# Patient Record
Sex: MALE | Race: ASIAN | Employment: OTHER | URBAN - METROPOLITAN AREA
[De-identification: names, ages, dates, MRNs, and addresses within clinical notes are randomized per-mention and may not be internally consistent; named-entity substitution may affect disease eponyms.]

---

## 2019-01-22 ENCOUNTER — OFFICE VISIT (OUTPATIENT)
Dept: FAMILY MEDICINE CLINIC | Age: 54
End: 2019-01-22

## 2019-01-22 VITALS
WEIGHT: 170.4 LBS | RESPIRATION RATE: 18 BRPM | BODY MASS INDEX: 25.24 KG/M2 | DIASTOLIC BLOOD PRESSURE: 79 MMHG | TEMPERATURE: 97.4 F | HEART RATE: 79 BPM | SYSTOLIC BLOOD PRESSURE: 105 MMHG | HEIGHT: 69 IN | OXYGEN SATURATION: 99 %

## 2019-01-22 DIAGNOSIS — I10 ESSENTIAL HYPERTENSION: Primary | ICD-10-CM

## 2019-01-22 DIAGNOSIS — E78.2 MIXED HYPERLIPIDEMIA: ICD-10-CM

## 2019-01-22 DIAGNOSIS — Z12.5 SCREENING FOR PROSTATE CANCER: ICD-10-CM

## 2019-01-22 DIAGNOSIS — Z23 ENCOUNTER FOR IMMUNIZATION: ICD-10-CM

## 2019-01-22 DIAGNOSIS — R51.9 NOCTURNAL HEADACHES: ICD-10-CM

## 2019-01-22 RX ORDER — EZETIMIBE 10 MG/1
10 TABLET ORAL DAILY
Qty: 90 TAB | Refills: 1 | Status: SHIPPED | OUTPATIENT
Start: 2019-01-22 | End: 2019-09-11 | Stop reason: SDUPTHER

## 2019-01-22 RX ORDER — LOSARTAN POTASSIUM 50 MG/1
50 TABLET ORAL DAILY
Qty: 90 TAB | Refills: 1 | Status: SHIPPED | OUTPATIENT
Start: 2019-01-22 | End: 2019-09-11 | Stop reason: SDUPTHER

## 2019-01-22 RX ORDER — LOSARTAN POTASSIUM 100 MG/1
TABLET ORAL
Refills: 3 | COMMUNITY
Start: 2018-12-11 | End: 2019-01-22 | Stop reason: DRUGHIGH

## 2019-01-22 RX ORDER — EZETIMIBE 10 MG/1
TABLET ORAL
COMMUNITY
End: 2019-01-22 | Stop reason: DRUGHIGH

## 2019-01-22 RX ORDER — KETOCONAZOLE 20 MG/ML
SHAMPOO TOPICAL
Refills: 3 | COMMUNITY
Start: 2018-12-04 | End: 2020-09-24 | Stop reason: ALTCHOICE

## 2019-01-22 NOTE — PROGRESS NOTES
[facility] Note    Eamon Jean is a 48 y.o. male who was seen in clinic today (2019). Subjective:  Cardiovascular Review:  The patient has hypertension and hyperlipidemia. Diet and Lifestyle: generally follows a low fat low cholesterol diet, generally follows a low sodium diet, exercises sporadically, nonsmoker  Home BP Monitorin/70s  Pertinent ROS: taking medications as instructed, no medication side effects noted, no TIA's, no chest pain on exertion, no dyspnea on exertion, no swelling of ankles. Headache   Patient complains of headaches for 2 month(s). Description of pain: dull pain, global. Duration of individual headaches: several hour(s), frequency several times per week. Associated symptoms: symptoms occur at night and wake him up from sleep, diminished vision. Pain relief: unable to obtain relief with OTC meds. Precipitating factors: patient is aware of none. Prior to Admission medications    Medication Sig Start Date End Date Taking? Authorizing Provider   losartan (COZAAR) 50 mg tablet Take 1 Tab by mouth daily. For blood pressure 19  Yes Joel Hoover, NP   ezetimibe (ZETIA) 10 mg tablet Take 1 Tab by mouth daily. For cholesterol 19  Yes Joel Hoover, NP   ketoconazole (NIZORAL) 2 % shampoo APPLY TO AFFECTED AREA, LATHER, LEAVE IN PLACE FOR 5 MINUTES AND THEN RINSE OFF WITH WATER ONCE A DAY. 18   Provider, Historical          No Known Allergies        ROS  See HPI    Objective:   Physical Exam   Constitutional: He is oriented to person, place, and time. He appears well-developed and well-nourished. Eyes: EOM are normal. Pupils are equal, round, and reactive to light. Neck: Normal range of motion. Neck supple. No JVD present. Carotid bruit is not present. No thyromegaly present. Cardiovascular: Normal rate, regular rhythm and intact distal pulses. Exam reveals no gallop and no friction rub.    No murmur heard.  Pulmonary/Chest: Effort normal and breath sounds normal. No respiratory distress. Musculoskeletal: He exhibits no edema. Lymphadenopathy:     He has no cervical adenopathy. Neurological: He is alert and oriented to person, place, and time. No sensory deficit. Coordination and gait normal.   Psychiatric: He has a normal mood and affect. His behavior is normal.   Nursing note and vitals reviewed. Visit Vitals  /79 (BP 1 Location: Right arm, BP Patient Position: Sitting)   Pulse 79   Temp 97.4 °F (36.3 °C) (Oral)   Resp 18   Ht 5' 9\" (1.753 m)   Wt 170 lb 6.4 oz (77.3 kg)   SpO2 99%   BMI 25.16 kg/m²       Assessment & Plan:  Diagnoses and all orders for this visit:    1. Essential hypertension  Well controlled. Will reduce to 50 mg patient request. Continue home monitoring and call for reading >140/90  -     losartan (COZAAR) 50 mg tablet; Take 1 Tab by mouth daily. For blood pressure  -     CBC W/O DIFF  -     METABOLIC PANEL, COMPREHENSIVE    2. Mixed hyperlipidemia  Reassess labs. Consider statin if needed. -     ezetimibe (ZETIA) 10 mg tablet; Take 1 Tab by mouth daily. For cholesterol  -     LIPID PANEL    3. Nocturnal headaches  Consider hypnic headache. MRI brain to rule out abnormality. Begin caffeine nightly. Referral to neurology for continued symptoms.   -     MRI BRAIN W WO CONT; Future  -     TSH AND FREE T4    4. Encounter for immunization  -     INFLUENZA VIRUS VAC QUAD,SPLIT,PRESV FREE SYRINGE IM    5. Screening for prostate cancer  -     PSA W/ REFLX FREE PSA      I have discussed the diagnosis with the patient and the intended plan as seen in the above orders. The patient has received an after-visit summary along with patient information handout. I have discussed medication side effects and warnings with the patient as well. Follow-up Disposition:  Return in about 6 months (around 7/22/2019) for blood pressure.         Danni Hinkle NP

## 2019-01-22 NOTE — PATIENT INSTRUCTIONS
???? ?????: ?????? ??????????? - [ Headache: Care Instructions ]  ????? ?????? ???????????    ???? ?????? ???? ???????? ???? ???? ???????? ???? ????? ????? ???? ??????? ????? ?? ??? ?? ???? ????? ??? ??? ???? ????? ??????? ?????? ???? ????? ???? ???????? ??????? ?????  ??????? ?????? ???? ?????? ??????? ??????, ??? ?????????? ??? ???? ??? ????? ??? ???? ???? ?????? ?? ???? ????? ?????? ??? ?????, ??? ???? ??????? ???? ??? ????  ????? ??????? ? ?????????? ???????? ??? ?? ????? ????? ?? ?????? ??? ?? ?????? ??? ??? ????? ?????????????? ??? ???? ??????????????? ?????? ????? ??????? ?? ??? ????? ????????????????? ???? ??? ???? ???? ???? ??? ??????? ???? ???? ????? ??????? ????? ????? ????? ??????? ???????? ????? ???? ??? ???? ?? ????? ???? ??????? ????, ??? ?????? ????? ???? ????? ??? ???? ????????  ?????? ???? ?????? ????? ???? ??????  · ????????? ????????????? ??? ???? ???? ???? ???? ??????? ???  · ?????? ?? ???? ????? ????? ???? ???????????, ??????? ??? ??????? ???? ??? ???? ???? ??? ??????? ?????? ?????? ???? ?? ?????? ???? ???? ?????? ?? ?? ???? ???? ????? ???  · ?????? ????? ????? ?????? ???? ??????, ???? ???? ?? ?????? ???? ????? 10-20 ??????? ???? ?????? ??? ?????? ??? ??? ????? ?????? ??????? ???? ????? ???? ??????  · ???? ??? ????? ???? ?? ????? ???????? ????? ???? ???? ??? ???? ?????? ?? ?? ??? ?? ????? ?? ????? ????? ??????? ?????  · ????? ????? ???? ?? ???? ????? ????? ????? ???? ?????  · ???????? ??????? ??????? ?????? ???? ????  ? ????? ??????? ??? ?????? ?????? ???? ???, ????? ??? ??????? ???? ?? ??????? ?????  ? ??? ???? ?????? ???? ?????????????? ???? ?? ??? ????? ????? ????????? ???????? ???? ?? ???? ?????? ????? ???? ???????????? ????? ???? ???? ?????? ?????  · ?????? ???? ?????? ?????? ??????? ????? ??? ??? ???? ???? ?????? ????? ????? ??, ???? ???? ??? ??? ???? ???? ???? ??? ?????? ???? ?? ??? ???? ????????? ??? ?????  · ???? ?????? ???? ???? ???? ?????? ???? ???? ?? ??????? ????? ??, ???? ????, ???????? ?? ??? ???, ????? ??????? ?? ?????????? ????? ??? ??? ???? ?????? ??????? ????? ??? ?????  ???? ????? ??????  · ???? ????? ????? ????????? ???? ????? ?? ?????? ????? ?????? ???? ?? ?? ????? ????? ???? ????? ?????? ?????????? ??????? ???? ????? ?? ???? ????? ????? ?????? ???? ????? ??? ???? ????? ???? ?????, ?? ?????? ????? ??? ?? ????? ????? ????? (???? ???, ???? ????? ????? ???, ????? ???? ????? ????? ?? ??????? ????) ?? ?????? ????? ???? ?????? ???? ????? ?? ???? ?????? ??? ???? ??? ?????, ???? ??? ??? ???, ???? ?????? ?? ??? ??? ?? ??????? ??? ?? ??? ?????? ???? ???? ? ????? ?????????? ??? ???? ????? ???? ??? ???? ??? ???? ????? ????? ???? ?????, ???? ????? (?????, ???, ?????) ?? ????, ??????, ??????? ???, ?????? ??? ?? ????? ????, ??? ?? ???? ???? ??????  · ?????? ????? ???? ???????? ???? ??????????? ???? ??????? ?????? ????? ??? ?? ??? ??? ????????? ???? ?????????? ???? ??? ?????? ??? ???? ???? ???? ??? ??? ???? ????? ?? ????? ??? ????? ???? ??? ?? ??? ??? ????????? ??????  · ?????? ????? ???????? ????? ???????? ????? ???? ?????? ????? ??????? ????? ????? ?????, ???, ??? ??? ?????? ????? ?? ???????? ?? ?? ????? ??? ??????? ???? ?????? ?????? ????? ?????? ??? ????? ???, ?? ?? ????? ???? ????? ???????, ??? ????? ?????? 30 ????????? ???? ??????? ?????  · ?????? ????? ? ??????? ?????  · ?????? ??? ???? ??????? ???? ???? ??? ?? ?????? ??? ???? ????? ??? ?????  · ???? ????? ???? ????? ????? ????? ???? ???? ????? ?????? ??? ???? ?????? ??????? ??? ???? ???????? ???? ????  · ???, ?? ?? ???? ??? ??? ????? ?????????? ?????? ????? ????? ?????? ???? ??? ???????? ???? ????? ?? ????? ????? ???? ?????? ???? ??? ?????  · ?????????? ??? ???? ??? ???????? ??? ??? ??? ??? ??? ???? ????? ????? ??????????? ??????? ???? ???? ???? ?????? ????? ??? ??? ????? ???? ????? ?????? ??? ??? ??? ????? ????????? ??? ???? ??? ??? ??? ??? ???? ????? ???? ????? ???? ????? ?? ??????? ?????  · ????? ??? ???????? ?? ?????????? ?? ????? ??????? ???? ????? ???? ?????? ???? ???? ?????? ????? ???? ??????? ???? ????? ????? ??? ????????? ?? ??????????? ?????? ???? ????? ????  ????? ??? ?????? ????? ?????  911 ?????? ??? ?????? ???? ???? ???? ???????? ??????? ???? ?????? ????????????, ??? ???? ???:  · ????? ????????? ????? ?????? ?? ????????? ??? ??? ????:  ? ???? ??? ??? ???, ???????, ?? ???, ??? ?? ??, ????? ??? ????? ?????? ???? ?? ???? ???????? ????? ?????  ? ???? ??? ????? ??????? ?????  ? ???? ??? ??? ???? ??????? ?????  ? ???? ??? ?????? ????????? ????? ??????? ?? ?????????? ?????  ? ???? ??? ?????? ?? ?????? ????????? ??????? ?????  ? ???? ??? ???? ???? ????? ????? ??????? ???? ????? ?????  ???? ????? ????????? ?????????? ???????? ??????? ?????? ???, ???:  · ????? ???? ??? ?? ??? ???? ???? ????? ??? ?????  · ????? ???? ????? ??? ?????  ?? ????? ???? ???? ???? ??? ???? ????? ???????  ????? [email or web address]  ?? ???????? M271 ??? ????? ???? ????????? ?????? ????? \"???? ?????: ?????? ??????????? - [ Headache: Care Instructions ]? \"  ??????? ?????? ???????: ??? 3, 2018  ??? ??????????? ???????: 11.9  © 8968-2698 [employer], [employer]. ?????????? ????? ????? ??????????? ?????? ??????????? ????? ????????? ????????? ??????? ????????? ?? ??? ???? ?? ????????? ????? ????? ???? ?????? ?????, ???? ?? ???? ???? ????? ????????? ?????????? ??? ???? ?? ????? ??????  Healthwise, Incorporated - ????? ?? ???? ??????? ???? ???? ??? ??? ?? ?? ????????? ??? ???

## 2019-01-22 NOTE — PROGRESS NOTES
Chief Complaint   Patient presents with    New Patient     to est. care    Hypertension     has been elevated recently and Losartan was increased from 50 mg to 100 mg daily    Headache     c/o h/a 's while sleeping x 2 months now, top of head       Reviewed Record in preparation for visit and have obtained necessary documentation. Identified pt with two pt identifiers (Name @ )    Health Maintenance Due   Topic    Hepatitis C Screening     DTaP/Tdap/Td series (1 - Tdap)    Shingrix Vaccine Age 50> (1 of 2)    FOBT Q 1 YEAR AGE 54-65     Influenza Age 5 to Adult          1. Have you been to the ER, urgent care clinic since your last visit? Hospitalized since your last visit? no    2. Have you seen or consulted any other health care providers outside of the [address] [place] [address] since your last visit? Include any pap smears or colon screening.  No

## 2019-01-24 LAB
ALBUMIN SERPL-MCNC: 4.5 G/DL (ref 3.5–5.5)
ALBUMIN/GLOB SERPL: 1.6 {RATIO} (ref 1.2–2.2)
ALP SERPL-CCNC: 78 IU/L (ref 39–117)
ALT SERPL-CCNC: 29 IU/L (ref 0–44)
AST SERPL-CCNC: 20 IU/L (ref 0–40)
BILIRUB SERPL-MCNC: 0.5 MG/DL (ref 0–1.2)
BUN SERPL-MCNC: 14 MG/DL (ref 6–24)
BUN/CREAT SERPL: 13 (ref 9–20)
CALCIUM SERPL-MCNC: 9.3 MG/DL (ref 8.7–10.2)
CHLORIDE SERPL-SCNC: 102 MMOL/L (ref 96–106)
CHOLEST SERPL-MCNC: 201 MG/DL (ref 100–199)
CO2 SERPL-SCNC: 23 MMOL/L (ref 20–29)
CREAT SERPL-MCNC: 1.08 MG/DL (ref 0.76–1.27)
ERYTHROCYTE [DISTWIDTH] IN BLOOD BY AUTOMATED COUNT: 14 % (ref 12.3–15.4)
GLOBULIN SER CALC-MCNC: 2.9 G/DL (ref 1.5–4.5)
GLUCOSE SERPL-MCNC: 118 MG/DL (ref 65–99)
HCT VFR BLD AUTO: 42.5 % (ref 37.5–51)
HDLC SERPL-MCNC: 37 MG/DL
HGB BLD-MCNC: 14.6 G/DL (ref 13–17.7)
INTERPRETATION, 910389: NORMAL
LDLC SERPL CALC-MCNC: 139 MG/DL (ref 0–99)
MCH RBC QN AUTO: 28.9 PG (ref 26.6–33)
MCHC RBC AUTO-ENTMCNC: 34.4 G/DL (ref 31.5–35.7)
MCV RBC AUTO: 84 FL (ref 79–97)
PLATELET # BLD AUTO: 230 X10E3/UL (ref 150–379)
POTASSIUM SERPL-SCNC: 4.4 MMOL/L (ref 3.5–5.2)
PROT SERPL-MCNC: 7.4 G/DL (ref 6–8.5)
PSA SERPL-MCNC: 0.5 NG/ML (ref 0–4)
RBC # BLD AUTO: 5.06 X10E6/UL (ref 4.14–5.8)
REFLEX CRITERIA: NORMAL
SODIUM SERPL-SCNC: 141 MMOL/L (ref 134–144)
T4 FREE SERPL-MCNC: 1.24 NG/DL (ref 0.82–1.77)
TRIGL SERPL-MCNC: 127 MG/DL (ref 0–149)
TSH SERPL DL<=0.005 MIU/L-ACNC: 1.12 UIU/ML (ref 0.45–4.5)
VLDLC SERPL CALC-MCNC: 25 MG/DL (ref 5–40)
WBC # BLD AUTO: 5.9 X10E3/UL (ref 3.4–10.8)

## 2019-02-13 ENCOUNTER — HOSPITAL ENCOUNTER (OUTPATIENT)
Dept: MRI IMAGING | Age: 54
Discharge: HOME | End: 2019-02-13

## 2019-02-13 DIAGNOSIS — R51.9 NOCTURNAL HEADACHES: ICD-10-CM

## 2019-03-28 ENCOUNTER — OFFICE VISIT (OUTPATIENT)
Dept: FAMILY MEDICINE CLINIC | Age: 54
End: 2019-03-28

## 2019-03-28 VITALS
HEIGHT: 69 IN | TEMPERATURE: 98.5 F | WEIGHT: 172 LBS | BODY MASS INDEX: 25.48 KG/M2 | SYSTOLIC BLOOD PRESSURE: 131 MMHG | RESPIRATION RATE: 19 BRPM | DIASTOLIC BLOOD PRESSURE: 83 MMHG | HEART RATE: 69 BPM | OXYGEN SATURATION: 99 %

## 2019-03-28 DIAGNOSIS — E78.2 MIXED HYPERLIPIDEMIA: ICD-10-CM

## 2019-03-28 DIAGNOSIS — M25.561 ACUTE PAIN OF RIGHT KNEE: ICD-10-CM

## 2019-03-28 DIAGNOSIS — K59.00 CONSTIPATION, UNSPECIFIED CONSTIPATION TYPE: ICD-10-CM

## 2019-03-28 DIAGNOSIS — R51.9 NOCTURNAL HEADACHES: ICD-10-CM

## 2019-03-28 DIAGNOSIS — K08.89 DENTALGIA: ICD-10-CM

## 2019-03-28 DIAGNOSIS — Z82.49 FAMILY HISTORY OF EARLY CAD: ICD-10-CM

## 2019-03-28 DIAGNOSIS — I10 ESSENTIAL HYPERTENSION: Primary | ICD-10-CM

## 2019-03-28 DIAGNOSIS — K64.8 INTERNAL HEMORRHOID: ICD-10-CM

## 2019-03-28 RX ORDER — ROSUVASTATIN CALCIUM 10 MG/1
10 TABLET, COATED ORAL
Qty: 90 TAB | Refills: 1 | Status: SHIPPED | OUTPATIENT
Start: 2019-03-28 | End: 2019-10-10 | Stop reason: ALTCHOICE

## 2019-03-28 RX ORDER — HYDROCORTISONE ACETATE 25 MG/1
25 SUPPOSITORY RECTAL EVERY 12 HOURS
Qty: 24 EACH | Refills: 2 | Status: SHIPPED | OUTPATIENT
Start: 2019-03-28 | End: 2019-10-10 | Stop reason: ALTCHOICE

## 2019-03-28 RX ORDER — POLYETHYLENE GLYCOL 3350 17 G/17G
17 POWDER, FOR SOLUTION ORAL
Qty: 30 EACH | Refills: 2 | Status: SHIPPED | OUTPATIENT
Start: 2019-03-28 | End: 2019-10-10 | Stop reason: ALTCHOICE

## 2019-03-28 NOTE — PROGRESS NOTES
[facility] Note    Genene Bence is a 48 y.o. male who was seen in clinic today (3/28/2019). Subjective:  Cardiovascular Review:  The patient has hypertension and hyperlipidemia. Patient reports family h/o early CAD (mother and sister)  Diet and Lifestyle: generally follows a low fat low cholesterol diet, generally follows a low sodium diet, exercises sporadically, nonsmoker  Home BP Monitorin/70s  Pertinent ROS: taking medications as instructed, no medication side effects noted, no TIA's, no chest pain on exertion, no dyspnea on exertion, no swelling of ankles. Headache   Patient complains of headaches for 4 month(s). Description of pain: dull pain, global. Duration of individual headaches: several hour(s), frequency several times per week. Associated symptoms: symptoms occur at night and wake him up from sleep, diminished vision. Pain relief: unable to obtain relief with OTC meds. Precipitating factors: patient is aware of none. MRI ordered but patient unable to perform due to claustrophobia. Patient reports intermittent lower tooth pain. Has not seen dentist.     Knee Pain  Patient complains of right knee pain. Onset of the symptoms was several weeks ago. Inciting event: no known injury. Current symptoms include pain location: right sided: anterior and severity of pain = moderate. Associated symptoms: none. Aggravating symptoms: running. Patient's overall course: symptoms have progressed to a point and plateaued. Patient has had no prior knee problems. Patient reports new onset of constipation. Reports internal hemorrhoids and fissure. Patient reports colonoscopy in 2016 with normal findings. Prior to Admission medications    Medication Sig Start Date End Date Taking? Authorizing Provider   polyethylene glycol (MIRALAX) 17 gram packet Take 1 Packet by mouth daily as needed (constipation).  3/28/19  Yes Carissa Hansen NP   hydrocortisone (ANUSOL-HC) 25 mg supp Insert 1 Suppository into rectum every twelve (12) hours. 3/28/19  Yes Nayeli Barbosa NP   rosuvastatin (CRESTOR) 10 mg tablet Take 1 Tab by mouth nightly. For cholesterol 3/28/19  Yes Yamilex Hoover NP   losartan (COZAAR) 50 mg tablet Take 1 Tab by mouth daily. For blood pressure 1/22/19  Yes Yamilex Hoover NP   ezetimibe (ZETIA) 10 mg tablet Take 1 Tab by mouth daily. For cholesterol 1/22/19  Yes Yamilex Hoover NP   ketoconazole (NIZORAL) 2 % shampoo APPLY TO AFFECTED AREA, LATHER, LEAVE IN PLACE FOR 5 MINUTES AND THEN RINSE OFF WITH WATER ONCE A DAY. 12/4/18   Provider, Historical          No Known Allergies        ROS  See HPI    Objective:   Physical Exam   Constitutional: He is oriented to person, place, and time. He appears well-developed and well-nourished. Neck: Normal range of motion. Neck supple. No JVD present. Carotid bruit is not present. No thyromegaly present. Cardiovascular: Normal rate, regular rhythm and intact distal pulses. Exam reveals no gallop and no friction rub. No murmur heard. Pulmonary/Chest: Effort normal and breath sounds normal. No respiratory distress. Musculoskeletal: He exhibits no edema. Right knee: He exhibits normal range of motion and no swelling. No tenderness found. Lymphadenopathy:     He has no cervical adenopathy. Neurological: He is alert and oriented to person, place, and time. Psychiatric: He has a normal mood and affect. His behavior is normal.   Nursing note and vitals reviewed. EKG: there are no previous tracings available for comparison, normal sinus rhythm. Visit Vitals  /83 (BP 1 Location: Left arm, BP Patient Position: Sitting)   Pulse 69   Temp 98.5 °F (36.9 °C) (Oral)   Resp 19   Ht 5' 9\" (1.753 m)   Wt 172 lb (78 kg)   SpO2 99%   BMI 25.40 kg/m²       Assessment & Plan:  Diagnoses and all orders for this visit:    1.  Essential hypertension  Stable, no changes to current therapy  - AMB POC EKG ROUTINE W/ 12 LEADS, INTER & REP    2. Mixed hyperlipidemia  ACC/AHA 10 year CVD risk calculation: 7.5%. Recommended adding staitn to imporve cholesterol profile and reduce CVD risk. -     rosuvastatin (CRESTOR) 10 mg tablet; Take 1 Tab by mouth nightly. For cholesterol    3. Family history of early CAD  As above. 4. Acute pain of right knee  Consider patellofemoral syndrome. X-ray today. Request physical therapy evaluation and treatment. -     XR KNEE RT 3 V; Future  -     REFERRAL TO PHYSICAL THERAPY    5. Nocturnal headaches  Resolving. Referral to neurology for continued symptoms. 6. Dentalgia  Contact information to [address] [place] [address] dental [employer] provided. 7. Constipation, unspecified constipation type  Begin Miralax. Referral to GI for continued symptoms. -     polyethylene glycol (MIRALAX) 17 gram packet; Take 1 Packet by mouth daily as needed (constipation). 8. Internal hemorrhoid  -     Refill hydrocortisone (ANUSOL-HC) 25 mg supp; Insert 1 Suppository into rectum every twelve (12) hours. I have discussed the diagnosis with the patient and the intended plan as seen in the above orders. The patient has received an after-visit summary along with patient information handout. I have discussed medication side effects and warnings with the patient as well. Follow-up and Dispositions    · Return in about 6 months (around 9/28/2019) for blood pressure.        Ne Brooks, NP       Counseling More Than 50% of the Appointment Time: Face-to face time >40 minutes

## 2019-03-28 NOTE — PROGRESS NOTES
Chief Complaint   Patient presents with    Hypertension    Knee Pain     right knee pain x 2 months       Reviewed Record in preparation for visit and have obtained necessary documentation. Identified pt with two pt identifiers (Name @ )    Health Maintenance Due   Topic    Hepatitis C Screening     FOBT Q 1 YEAR AGE 50-75          1. Have you been to the ER, urgent care clinic since your last visit? Hospitalized since your last visit? no    2. Have you seen or consulted any other health care providers outside of the [address] [place] [address] since your last visit? Include any pap smears or colon screening.  no

## 2019-03-28 NOTE — PATIENT INSTRUCTIONS
Knee Pain or Injury: Care Instructions  Your Care Instructions    Injuries are a common cause of knee problems. Sudden (acute) injuries may be caused by a direct blow to the knee. They can also be caused by abnormal twisting, bending, or falling on the knee. Pain, bruising, or swelling may be severe, and may start within minutes of the injury. Overuse is another cause of knee pain. Other causes are climbing stairs, kneeling, and other activities that use the knee. Everyday wear and tear, especially as you get older, also can cause knee pain. Rest, along with home treatment, often relieves pain and allows your knee to heal. If you have a serious knee injury, you may need tests and treatment. Follow-up care is a key part of your treatment and safety. Be sure to make and go to all appointments, and call your doctor if you are having problems. It's also a good idea to know your test results and keep a list of the medicines you take. How can you care for yourself at home? · Be safe with medicines. Read and follow all instructions on the label. ? If the doctor gave you a prescription medicine for pain, take it as prescribed. ? If you are not taking a prescription pain medicine, ask your doctor if you can take an over-the-counter medicine. · Rest and protect your knee. Take a break from any activity that may cause pain. · Put ice or a cold pack on your knee for 10 to 20 minutes at a time. Put a thin cloth between the ice and your skin. · Prop up a sore knee on a pillow when you ice it or anytime you sit or lie down for the next 3 days. Try to keep it above the level of your heart. This will help reduce swelling. · If your knee is not swollen, you can put moist heat, a heating pad, or a warm cloth on your knee. · If your doctor recommends an elastic bandage, sleeve, or other type of support for your knee, wear it as directed.   · Follow your doctor's instructions about how much weight you can put on your leg. Use a cane, crutches, or a walker as instructed. · Follow your doctor's instructions about activity during your healing process. If you can do mild exercise, slowly increase your activity. · Reach and stay at a healthy weight. Extra weight can strain the joints, especially the knees and hips, and make the pain worse. Losing even a few pounds may help. When should you call for help? Call 911 anytime you think you may need emergency care. For example, call if:    · You have symptoms of a blood clot in your lung (called a pulmonary embolism). These may include:  ? Sudden chest pain. ? Trouble breathing. ? Coughing up blood.    Call your doctor now or seek immediate medical care if:    · You have severe or increasing pain.     · Your leg or foot turns cold or changes color.     · You cannot stand or put weight on your knee.     · Your knee looks twisted or bent out of shape.     · You cannot move your knee.     · You have signs of infection, such as:  ? Increased pain, swelling, warmth, or redness. ? Red streaks leading from the knee. ? Pus draining from a place on your knee. ? A fever.     · You have signs of a blood clot in your leg (called a deep vein thrombosis), such as:  ? Pain in your calf, back of the knee, thigh, or groin. ? Redness and swelling in your leg or groin.    Watch closely for changes in your health, and be sure to contact your doctor if:    · You have tingling, weakness, or numbness in your knee.     · You have any new symptoms, such as swelling.     · You have bruises from a knee injury that last longer than 2 weeks.     · You do not get better as expected. Where can you learn more? Go to http://ai-kaleigh.info/. Enter K195 in the search box to learn more about \"Knee Pain or Injury: Care Instructions. \"  Current as of: September 23, 2018  Content Version: 11.9  © 3642-0478 [employer].  Care instructions adapted under license by [employer] (which disclaims liability or warranty for this information). If you have questions about a medical condition or this instruction, always ask your healthcare professional. [address] any warranty or liability for your use of this information.

## 2019-03-28 NOTE — LETTER
3/28/2019 3:09 PM 
 
Mr. Argenis Pederson [employer] [address] [place] [id number] Dear Argenis Pederson: 
 
Please find your most recent results below. Resulted Orders XR KNEE RT 3 V (Exam End: 3/28/2019  1:09 PM) Narrative EXAM: XR KNEE RT 3 V 
 
INDICATION: right knee pain. COMPARISON: None. FINDINGS: Three views of the right knee demonstrate no fracture or other acute 
osseous or articular abnormality. There is no effusion. Impression IMPRESSION: No acute abnormality. Right knee X-ray was normal. Follow up with PT as recommended. Referral to orthopedics for continued symptoms. Please call me if you have any questions: [phone] Sincerely, [place] Westfall NP

## 2019-04-08 ENCOUNTER — HOSPITAL ENCOUNTER (OUTPATIENT)
Dept: PHYSICAL THERAPY | Age: 54
Discharge: HOME | End: 2019-04-08
Payer: COMMERCIAL

## 2019-04-08 PROCEDURE — 97110 THERAPEUTIC EXERCISES: CPT | Performed by: PHYSICAL THERAPY ASSISTANT

## 2019-04-08 PROCEDURE — 97014 ELECTRIC STIMULATION THERAPY: CPT | Performed by: PHYSICAL THERAPY ASSISTANT

## 2019-04-08 PROCEDURE — 97162 PT EVAL MOD COMPLEX 30 MIN: CPT | Performed by: PHYSICAL THERAPY ASSISTANT

## 2019-04-08 NOTE — PROGRESS NOTES
[employer] [facility]  [address]  [place], [address] [facility]  Phone: [phone]  Fax: [phone]    Plan of Care/Statement of Necessity for Physical Therapy Services  2-15    Patient name: Milka Umanzor  : 1965  Provider#: [id number]  Referral source: Berkley Sharpe NP      Medical/Treatment Diagnosis: Right knee pain [M25.561]     Prior Hospitalization: see medical history     Comorbidities: see chart  Prior Level of Function: running  Medications: Verified on Patient Summary List  Start of Care: 19      Onset Date: few weeks ago  The Plan of Care and following information is based on the information from the initial evaluation. Assessment/ key information: Pt has signs and symptoms of mild medial meniscus derangement that affects his ability to run. Pt was given KT tape and educated on gym modifications/ice application. Pt will return next week to check progress.     Evaluation Complexity History LOW Complexity : Zero comorbidities / personal factors that will impact the outcome / POC; Examination LOW Complexity : 1-2 Standardized tests and measures addressing body structure, function, activity limitation and / or participation in recreation  ;Presentation LOW Complexity : Stable, uncomplicated  ;Clinical Decision Making MEDIUM Complexity : FOTO score of 26-74  Overall Complexity Rating: LOW     Problem List: pain affecting function, decrease ROM, decrease strength, edema affecting function, impaired gait/ balance, decrease ADL/ functional abilitiies, decrease activity tolerance and decrease flexibility/ joint mobility   Treatment Plan may include any combination of the following: Therapeutic exercise, Therapeutic activities, Neuromuscular re-education, Physical agent/modality, Gait/balance training, Manual therapy and Patient education  Patient / Family readiness to learn indicated by: asking questions  Persons(s) to be included in education: patient (P)  Barriers to Learning/Limitations: None  Patient Goal (s): decrease my pain after I run  Patient Self Reported Health Status: good  Rehabilitation Potential: good    Short Term Goals: To be accomplished in 2 weeks:  Pt will be independent w/ HEP  Pt will report 50% decrease in pain when walking  Pt will demonstrate ability to navigate stairs w/o pain  Long Term Goals: To be accomplished in 6 weeks:  Pt will be able to run for 20 minutes w/o increase in pain  Pt will demonstrate full squats w/o pain  Frequency / Duration: Patient to be seen 1 times per week for 6 weeks. Patient/ Caregiver education and instruction: self care    [x]  Plan of care has been reviewed with PTA        Certification Period: 4/8/19-7/8/19  Santa Lanier, PT, DPT 4/8/2019    ________________________________________________________________________    I certify that the above Therapy Services are being furnished while the patient is under my care. I agree with the treatment plan and certify that this therapy is necessary.     [de-identified] Signature:____________________  Date:____________Time: _________

## 2019-04-08 NOTE — PROGRESS NOTES
PT INITIAL EVALUATION NOTE - [facility] 2-15    Patient Name: Anjum Mcguire  Date:2019  : 1965  [x]  Patient  Verified  Payor: [address] / Plan: [address] / Product Type: Managed Care Medicaid /    In time:11:15 am  Out time:12:00 pm  Total Treatment Time (min): 45  Total Timed Codes (min): 15  1:1 Treatment Time ( only): 15   Visit #: 1     Treatment Area: Right knee pain [M25.561]    SUBJECTIVE  Pain Level (0-10 scale): 0  Any medication changes, allergies to medications, adverse drug reactions, diagnosis change, or new procedure performed?: [] No    [x] Yes (see summary sheet for update)  Subjective:    Pt complains of R knee pain that started a few weeks ago. Pt only has pain after running on treadmill or squatting and twisting knee. Pt received x-ray that is negative. 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     PLOF: running at gym  Mechanism of Injury: running  Previous Treatment/Compliance: none  PMHx/Surgical Hx: see chart  Work Hx:   Living Situation: w/ 3 daughters and wife, 1 story  Pt Goals: \"decrease my pain after I run\"  Barriers: none  Motivation: good  Substance use: none  FABQ Score: see FOTO  Cognition: A & O x 4        OBJECTIVE/EXAMINATION  Posture:  normal  Gait and Functional Mobility:  normal  Palpation: TTP over medial joint line    R Knee ROM: full w/o pain    LOWER QUARTER   MUSCLE STRENGTH  KEY       R  L  0 - No Contraction  L1, L2 Psoas  5  5  1 - Trace   L3 Quads  5  5  2 - Poor   L4 Tib Ant  5  5  3 - Fair    L5 EHL  5  5  4 - Good   S1 FHL  5  5  5 - Normal   S2 Hams  5  5          MMT: 5/5  Neurological: Reflexes / Sensations: normal  Special Tests: + roly's, - thessaly's    Modality rationale: decrease edema, decrease inflammation and decrease pain to improve the patients ability to ambulate and run   Min Type Additional Details    [x] Estim: []Att   [x]Unatt        []TENS instruct                  []IFC []Premod   [x]NMES                     []Other:  []w/US   [x]w/ice   []w/heat  Position: supine  Location: medial joint line- 10'    []  Traction: [] Cervical       []Lumbar                       [] Prone          []Supine                       []Intermittent   []Continuous Lbs:  [] before manual  [] after manual  []w/heat    []  Ultrasound: []Continuous   [] Pulsed at:                           []1MHz   []3MHz Location:  W/cm2:    [] Paraffin         Location:   []w/heat    []  Ice     []  Heat  []  Ice massage Position:  Location:    []  Laser  []  Other: Position:  Location:      []  Vasopneumatic Device Pressure:       [] lo [] med [] hi   Temperature:      [x] Skin assessment post-treatment:  [x]intact []redness- no adverse reaction    []redness - adverse reaction:     15 min Therapeutic Exercise:  [x] See flow sheet :   Rationale: increase ROM, increase strength and improve coordination to improve the patients ability to ambulate and run      With   [] TE   [] TA   [] neuro   [] other: Patient Education: [x] Review HEP    [] Progressed/Changed HEP based on:   [] positioning   [] body mechanics   [] transfers   [] heat/ice application    [] other:      Other Objective/Functional Measures:     Pain Level (0-10 scale) post treatment: 0    ASSESSMENT/Changes in Function:     [x]  See Plan of Care      Prisca Lanier, PT, DPT 4/8/2019

## 2019-04-17 ENCOUNTER — TELEPHONE (OUTPATIENT)
Dept: FAMILY MEDICINE CLINIC | Age: 54
End: 2019-04-17

## 2019-04-17 NOTE — TELEPHONE ENCOUNTER
Pt came inside and stated that the hydrocortisone (ANUSOL-HC) 25 is not covered with his ins  Pt also stated that he went to physical therapy and he was told he is not a possible candidate for therepy     verified pt info and pharmacy  info

## 2019-04-18 RX ORDER — HYDROCORTISONE ACETATE, PRAMOXINE HCL 2.5; 1 G/100G; G/100G
CREAM TOPICAL 3 TIMES DAILY
Qty: 30 G | Refills: 0 | Status: SHIPPED | OUTPATIENT
Start: 2019-04-18 | End: 2020-02-17 | Stop reason: ALTCHOICE

## 2019-04-18 NOTE — TELEPHONE ENCOUNTER
Alternative hemorrhoid medication sent to pharmacy. I reviewed PT note. It indicates patient is a good candidate for therapy and recommend continuing therapy.

## 2019-04-20 ENCOUNTER — OFFICE VISIT (OUTPATIENT)
Dept: FAMILY MEDICINE CLINIC | Age: 54
End: 2019-04-20

## 2019-04-20 VITALS
SYSTOLIC BLOOD PRESSURE: 110 MMHG | TEMPERATURE: 97 F | OXYGEN SATURATION: 100 % | RESPIRATION RATE: 16 BRPM | WEIGHT: 170 LBS | HEART RATE: 73 BPM | HEIGHT: 69 IN | DIASTOLIC BLOOD PRESSURE: 76 MMHG | BODY MASS INDEX: 25.18 KG/M2

## 2019-04-20 DIAGNOSIS — M25.561 RIGHT KNEE PAIN, UNSPECIFIED CHRONICITY: Primary | ICD-10-CM

## 2019-04-20 DIAGNOSIS — L40.9 PSORIASIS: ICD-10-CM

## 2019-04-20 RX ORDER — CLOBETASOL PROPIONATE 0.5 MG/G
OINTMENT TOPICAL 2 TIMES DAILY
Qty: 30 G | Refills: 0 | Status: SHIPPED | OUTPATIENT
Start: 2019-04-20 | End: 2020-02-17 | Stop reason: ALTCHOICE

## 2019-04-20 NOTE — PROGRESS NOTES
Chief Complaint   Patient presents with    Knee Pain     has had ongoing rt. knee pain for several weeks. Saw Roxy Burton  and xrayed ( neg ) and PT . kt  tape applied , no better       Reviewed Record in preparation for visit and have obtained necessary documentation. Identified pt with two pt identifiers (Name @ )    Health Maintenance Due   Topic    Hepatitis C Screening     DTaP/Tdap/Td series (1 - Tdap)    FOBT Q 1 YEAR AGE 50-75          1. Have you been to the ER, urgent care clinic since your last visit? Hospitalized since your last visit? no    2. Have you seen or consulted any other health care providers outside of the [address] [place] [address] since your last visit? Include any pap smears or colon screening.  no

## 2019-04-20 NOTE — PROGRESS NOTES
HISTORY OF PRESENT ILLNESS  Jaswinder Baeza is a 48 y.o. male. 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 He currently is in PT for knee pain. Denies locking/giving out. No swelling. Used ice pack with sx relief. C/o itchy rash on right knee, has had for several months. Past medical history, social history, family history and medications were reviewed and updated. Blood pressure 110/76, pulse 73, temperature 97 °F (36.1 °C), temperature source Oral, resp. rate 16, height 5' 9\" (1.753 m), weight 170 lb (77.1 kg), SpO2 100 %. Review of Systems   Constitutional: Negative. Musculoskeletal: Positive for joint pain. Skin: Positive for itching and rash. All other systems reviewed and are negative. Physical Exam   Constitutional: No distress. Musculoskeletal:        Right knee: He exhibits normal range of motion, no swelling, no LCL laxity, no bony tenderness and no MCL laxity. Tenderness found. Medial joint line tenderness noted. Skin: Rash (Approximately quarter sized silvery plaque on right knee.  ) noted. ASSESSMENT and PLAN  Diagnoses and all orders for this visit:    1. Right knee pain, unspecified chronicity  Currently in PT for symptoms. Mild exacerbation after prolonged sitting, now resolved. Recommend periodic stretching when driving long distances. Trial salon pas or biofreeze ointment prn. Continue to ice as needed. Avoid applying ointment to rash. 2. Psoriasis  -     clobetasol (TEMOVATE) 0.05 % ointment; Apply  to affected area two (2) times a day. Use as directed. Follow up prn.

## 2019-05-30 NOTE — PROGRESS NOTES
[facility]  [address]  [place], [address] [facility]  Phone: [phone]  Fax: [phone]    Discharge Summary  2-15    Patient name: Catalina Christian  : 1965  Provider#:[id number]  Referral source: Magalie Blakely NP      Medical/Treatment Diagnosis: Right knee pain [M25.561]     Prior Hospitalization: see medical history     Comorbidities: see chart  Prior Level of Function:runner  Medications: Verified on Patient Summary List    Start of Care: 19      Onset Date:few weeks ago   Visits from Start of Care: 1     Missed Visits: 0  Reporting Period : 19 to 19      ASSESSMENT/SUMMARY OF CARE: Pt attended initial eval and was educated on buying knee brace. Pt was given HEP and return to running protocol.      RECOMMENDATIONS:  [x]Discontinue therapy: [x]Patient has reached or is progressing toward set goals      []Patient is non-compliant or has abdicated      []Due to lack of appreciable progress towards set goals    Marycarmen Campos PT, DPT 2019

## 2019-09-11 DIAGNOSIS — I10 ESSENTIAL HYPERTENSION: ICD-10-CM

## 2019-09-11 DIAGNOSIS — E78.2 MIXED HYPERLIPIDEMIA: ICD-10-CM

## 2019-09-11 RX ORDER — LOSARTAN POTASSIUM 50 MG/1
50 TABLET ORAL DAILY
Qty: 30 TAB | Refills: 5 | Status: SHIPPED | OUTPATIENT
Start: 2019-09-11 | End: 2019-09-24 | Stop reason: RX

## 2019-09-11 RX ORDER — EZETIMIBE 10 MG/1
10 TABLET ORAL DAILY
Qty: 30 TAB | Refills: 5 | Status: SHIPPED | OUTPATIENT
Start: 2019-09-11 | End: 2020-03-19

## 2019-09-20 ENCOUNTER — TELEPHONE (OUTPATIENT)
Dept: FAMILY MEDICINE CLINIC | Age: 54
End: 2019-09-20

## 2019-09-20 DIAGNOSIS — I10 ESSENTIAL HYPERTENSION: Primary | ICD-10-CM

## 2019-09-20 NOTE — TELEPHONE ENCOUNTER
Called [facility] to find out what's going on per pharmacist Lorsartan is in manufacture back order so they need alternative.  Advised will send message to NP [facility]

## 2019-09-20 NOTE — TELEPHONE ENCOUNTER
Patient is calling stating that he went to  the medication  losartan (COZAAR) 50 mg tablet [[id number]]     Order Details   But pharmacy states they are not making this medication anymore. Requesting an alternative Script be sent to the pharmacy  LOV:April 20, 2019 09:00 AM  BCB# : [phone]  Pharmacy verified     Patient is completely out and need the medication today.

## 2019-09-24 RX ORDER — VALSARTAN 40 MG/1
40 TABLET ORAL DAILY
Qty: 90 TAB | Refills: 1 | Status: SHIPPED | OUTPATIENT
Start: 2019-09-24 | End: 2019-10-10 | Stop reason: ALTCHOICE

## 2019-10-09 ENCOUNTER — TELEPHONE (OUTPATIENT)
Dept: FAMILY MEDICINE CLINIC | Age: 54
End: 2019-10-09

## 2019-10-09 NOTE — TELEPHONE ENCOUNTER
Call placed to patient. Name and  verified. Patient requested to establish care with Dr. Christopher Jerez. Advised patient of provider next new patient appt 3/25/20. He got angry and stated he is not a new patient he sees NP Sneha. Advised that he is established with Eliecer Seo but if he wants to change providers he would be a new patient and would be scheduled in a new patient slot. Patient stated so if I have something wrong and I die you wont let me see a doctor. Advised that he is established and if he feels ill he can seek urgent care. He stated he sees NP but he knows nothing, and he wants to see a doctor. Patient because belligerent with writer and raised voice. Advised I understand his concerns but again as a new patient he would be scheduled beck new patient slot.  Patient scheduled for 2020

## 2019-10-09 NOTE — TELEPHONE ENCOUNTER
Pt states his wife Britany Cornelius is Dr Tello Mcduffie pt and he wants to be her patient, he states he spoke to Dr Gibran Greenwood about this and she was okay with it. Requesting a new patient apt soon.   BCB# [phone]     Please advice

## 2019-10-10 ENCOUNTER — OFFICE VISIT (OUTPATIENT)
Dept: FAMILY MEDICINE CLINIC | Age: 54
End: 2019-10-10

## 2019-10-10 VITALS
HEIGHT: 69 IN | TEMPERATURE: 97.9 F | DIASTOLIC BLOOD PRESSURE: 79 MMHG | BODY MASS INDEX: 24.59 KG/M2 | HEART RATE: 74 BPM | WEIGHT: 166 LBS | SYSTOLIC BLOOD PRESSURE: 120 MMHG | RESPIRATION RATE: 16 BRPM | OXYGEN SATURATION: 98 %

## 2019-10-10 DIAGNOSIS — Z23 ENCOUNTER FOR IMMUNIZATION: ICD-10-CM

## 2019-10-10 DIAGNOSIS — M22.2X1 PATELLOFEMORAL SYNDROME OF RIGHT KNEE: Primary | ICD-10-CM

## 2019-10-10 RX ORDER — LOSARTAN POTASSIUM 50 MG/1
TABLET ORAL
Refills: 1 | COMMUNITY
Start: 2019-09-25 | End: 2020-01-22 | Stop reason: ALTCHOICE

## 2019-10-10 RX ORDER — DICLOFENAC SODIUM 75 MG/1
75 TABLET, DELAYED RELEASE ORAL 2 TIMES DAILY
Qty: 60 TAB | Refills: 0 | Status: SHIPPED | OUTPATIENT
Start: 2019-10-10 | End: 2020-01-22 | Stop reason: ALTCHOICE

## 2019-10-10 NOTE — PROGRESS NOTES
Chief Complaint   Patient presents with    Knee Pain     Patient is in the office today with complaints of Bilateral Knee Pain. 1. Have you been to the ER, urgent care clinic since your last visit? Hospitalized since your last visit? No    2. Have you seen or consulted any other health care providers outside of the [address] [place] [address] since your last visit? Include any pap smears or colon screening.  No

## 2019-10-10 NOTE — PATIENT INSTRUCTIONS
Vaccine Information Statement    Influenza (Flu) Vaccine (Inactivated or Recombinant): What You Need to Know    Many Vaccine Information Statements are available in Kiswahili and other languages. See www.immunize.org/vis  Hojas de información sobre vacunas están disponibles en español y en muchos otros idiomas. Visite www.immunize.org/vis    1. Why get vaccinated? Influenza vaccine can prevent influenza (flu). Flu is a contagious disease that spreads around the [facility] every year, usually between October and May. Anyone can get the flu, but it is more dangerous for some people. Infants and young children, people 72years of age and older, pregnant women, and people with certain health conditions or a weakened immune system are at greatest risk of flu complications. Pneumonia, bronchitis, sinus infections and ear infections are examples of flu-related complications. If you have a medical condition, such as heart disease, cancer or diabetes, flu can make it worse. Flu can cause fever and chills, sore throat, muscle aches, fatigue, cough, headache, and runny or stuffy nose. Some people may have vomiting and diarrhea, though this is more common in children than adults. Each year thousands of people in the [facility] die from flu, and many more are hospitalized. Flu vaccine prevents millions of illnesses and flu-related visits to the doctor each year. 2. Influenza vaccines     CDC recommends everyone 10months of age and older get vaccinated every flu season. Children 6 months through 6years of age may need 2 doses during a single flu season. Everyone else needs only 1 dose each flu season. It takes about 2 weeks for protection to develop after vaccination. There are many flu viruses, and they are always changing. Each year a new flu vaccine is made to protect against three or four viruses that are likely to cause disease in the upcoming flu season.  Even when the vaccine doesnt exactly Verified Results  XR CHEST 2V [id number] 11:54AM PEYMAN WELDON     Test Name Result Flag Reference   XR CHEST PA, LATERAL 2V (Report)     Accession #    [id number]    XR CHEST 2V - 7/7/2018 11:54 AM    INDICATION:  COUGH    COMPARISON:  None.    FINDINGS:    The cardiomediastinal silhouette is unremarkable. The lungs are clear. The pulmonary vasculature   is unremarkable. The visualized bones are grossly intact.    IMPRESSION:    No evidence of acute cardiopulmonary process.    **** F I N A L ****    Transcribed By: REGINALDO   07/09/18 12:20 pm    Dictated By:      ZAKIYA ARREDONDO DO    Electronically Reviewed and Approved By:      ZAKIYA ARREDONDO DO 07/09/18 12:20 pm       Message   chest xray is normal      match these viruses, it may still provide some protection. Influenza vaccine does not cause flu. Influenza vaccine may be given at the same time as other vaccines. 3. Talk with your health care provider    Tell your vaccine provider if the person getting the vaccine:   Has had an allergic reaction after a previous dose of influenza vaccine, or has any severe, life-threatening allergies.  Has ever had Guillain-Barré Syndrome (also called GBS). In some cases, your health care provider may decide to postpone influenza vaccination to a future visit. People with minor illnesses, such as a cold, may be vaccinated. People who are moderately or severely ill should usually wait until they recover before getting influenza vaccine. Your health care provider can give you more information. 4. Risks of a reaction     Soreness, redness, and swelling where shot is given, fever, muscle aches, and headache can happen after influenza vaccine.  There may be a very small increased risk of Guillain-Barré Syndrome (GBS) after inactivated influenza vaccine (the flu shot). Baylee Briseno children who get the flu shot along with pneumococcal vaccine (PCV13), and/or DTaP vaccine at the same time might be slightly more likely to have a seizure caused by fever. Tell your health care provider if a child who is getting flu vaccine has ever had a seizure. People sometimes faint after medical procedures, including vaccination. Tell your provider if you feel dizzy or have vision changes or ringing in the ears. As with any medicine, there is a very remote chance of a vaccine causing a severe allergic reaction, other serious injury, or death. 5. What if there is a serious problem? An allergic reaction could occur after the vaccinated person leaves the clinic.  If you see signs of a severe allergic reaction (hives, swelling of the face and throat, difficulty breathing, a fast heartbeat, dizziness, or weakness), call 9-1-1 and get the person to the nearest hospital.    For other signs that concern you, call your health care provider. Adverse reactions should be reported to the Vaccine Adverse Event Reporting System (VAERS). Your health care provider will usually file this report, or you can do it yourself. Visit the VAERS website at www.vaers. [facility].gov or call 7-487.400.7935. VAERS is only for reporting reactions, and VAERS staff do not give medical advice. 6. The National Vaccine Injury Compensation Program    The [facility] Vaccine Injury Compensation Program (VICP) is a federal program that was created to compensate people who may have been injured by certain vaccines. Visit the VICP website at www.[facility].gov/vaccinecompensation or call 3-631.737.3995 to learn about the program and about filing a claim. There is a time limit to file a claim for compensation. 7. How can I learn more?  Ask your health care provider.  Call your local or state health department.  Contact the Centers for Disease Control and Prevention (CDC):  - Call 2-535.152.3546 (1-800-CDC-INFO) or  - Visit CDCs influenza website at www.cdc.gov/flu    Vaccine Information Statement (Interim)  Inactivated Influenza Vaccine   8/15/2019  42 RONYYue Carrera Toño [id number]   Department of Health and Human Services  Centers for Disease Control and Prevention    Office Use Only         Patellofemoral Pain Syndrome (Runner's Knee): Exercises  Introduction  Here are some examples of exercises for you to try. The exercises may be suggested for a condition or for rehabilitation. Start each exercise slowly. Ease off the exercises if you start to have pain. You will be told when to start these exercises and which ones will work best for you. How to do the exercises  Calf wall stretch    1. Stand facing a wall with your hands on the wall at about eye level. Put your affected leg about a step behind your other leg.   2. Keeping your back leg straight and your back heel on the floor, bend your front knee and gently bring your hip and chest toward the wall until you feel a stretch in the calf of your back leg. 3. Hold the stretch for at least 15 to 30 seconds. 4. Repeat 2 to 4 times. 5. Repeat steps 1 through 4, but this time keep your back knee bent. Quadriceps stretch    1. If you are not steady on your feet, hold on to a chair, counter, or wall. 2. Bend your affected leg, and reach behind you to grab the front of your foot or ankle with the hand on the same side. For example, if you are stretching your right leg, use your right hand. 3. Keeping your knees next to each other, pull your foot toward your buttock until you feel a gentle stretch across the front of your hip and down the front of your thigh. Your knee should be pointed directly to the ground, and not out to the side. 4. Hold the stretch for at least 15 to 30 seconds. 5. Repeat 2 to 4 times. Hamstring wall stretch    1. Lie on your back in a doorway, with your good leg through the open door. 2. Slide your affected leg up the wall to straighten your knee. You should feel a gentle stretch down the back of your leg. 1. Do not arch your back. 2. Do not bend either knee. 3. Keep one heel touching the floor and the other heel touching the wall. Do not point your toes. 3. Hold the stretch for at least 1 minute. Then over time, try to lengthen the time you hold the stretch to as long as 6 minutes. 4. Repeat 2 to 4 times. 5. If you do not have a place to do this exercise in a doorway, there is another way to do it:  6. Lie on your back, and bend your affected leg. 7. Loop a towel under the ball and toes of that foot, and hold the ends of the towel in your hands. 8. Straighten your knee, and slowly pull back on the towel. You should feel a gentle stretch down the back of your leg. 9. Hold the stretch for at least 15 to 30 seconds. Or even better, hold the stretch for 1 minute if you can.   10. Repeat 2 to 4 times.    Quad sets    1. Sit with your affected leg straight and supported on the floor or a firm bed. Place a small, rolled-up towel under your affected knee. Your other leg should be bent, with that foot flat on the floor. 2. Tighten the thigh muscles of your affected leg by pressing the back of your knee down into the towel. 3. Hold for about 6 seconds, then rest for up to 10 seconds. 4. Repeat 8 to 12 times. Straight-leg raises to the front    1. Lie on your back with your good knee bent so that your foot rests flat on the floor. Your affected leg should be straight. Make sure that your low back has a normal curve. You should be able to slip your hand in between the floor and the small of your back, with your palm touching the floor and your back touching the back of your hand. 2. Tighten the thigh muscles in your affected leg by pressing the back of your knee flat down to the floor. Hold your knee straight. 3. Keeping the thigh muscles tight and your leg straight, lift your affected leg up so that your heel is about 12 inches off the floor. 4. Hold for about 6 seconds, then lower your leg slowly. Rest for up to 10 seconds between repetitions. 5. Repeat 8 to 12 times. Straight-leg raises to the back    1. Lie on your stomach, and lift your leg straight up behind you (toward the ceiling). 2. Lift your toes about 6 inches off the floor, hold for about 6 seconds, then lower slowly. 3. Do 8 to 12 repetitions. Wall slide with ball squeeze    1. Stand with your back against a wall and with your feet about shoulder-width apart. Your feet should be about 12 inches away from the wall. 2. Put a ball about the size of a soccer ball between your knees. Then slowly slide down the wall until your knees are bent about 20 to 30 degrees. 3. Tighten your thigh muscles by squeezing the ball between your knees. Hold that position for about 10 seconds, then stop squeezing.  Rest for up to 10 seconds between repetitions. 4. Repeat 8 to 12 times. Follow-up care is a key part of your treatment and safety. Be sure to make and go to all appointments, and call your doctor if you are having problems. It's also a good idea to know your test results and keep a list of the medicines you take. Where can you learn more? Go to http://ai-kaleigh.info/. Enter A404 in the search box to learn more about \"Patellofemoral Pain Syndrome (Runner's Knee): Exercises. \"  Current as of: June 26, 2019  Content Version: 12.2  © 1411-7608 [employer]. Care instructions adapted under license by [employer] (which disclaims liability or warranty for this information). If you have questions about a medical condition or this instruction, always ask your healthcare professional. [address] any warranty or liability for your use of this information.

## 2019-10-10 NOTE — PROGRESS NOTES
HISTORY OF PRESENT ILLNESS  Seferino Liu is a 47 y.o. male. Seen for pain right knee for almost 6 months. HPI  Knee Pain  Patient complains of right knee pain. Onset of the symptoms was several months ago. Inciting event: none known. Current symptoms include pain location: right sided: anterior, medial, severity of pain = moderate and swelling: mild. denies knee giving out, knee locking, crepitus sensation, foreign body sensation and popping sensation. Aggravating symptoms: going up and down stairs, kneeling, pivoting, rising after sitting. Patient's overall course: symptoms have progressed to a point and plateaued. and course of pain: symptoms have progressed to a point and plateaued. Patient has had prior knee problems. Patient denies fever, fall. Evaluation to date: plain films: normal, PT evaluation had one PT session. Treatment to date: OTC analgesics: not very effective. Review of Systems   Constitutional: Negative for chills, fever and malaise/fatigue. HENT: Negative for congestion, ear pain, sore throat and tinnitus. Eyes: Negative for blurred vision, double vision, pain and discharge. Respiratory: Negative for cough, shortness of breath and wheezing. Cardiovascular: Negative for chest pain, palpitations and leg swelling. Gastrointestinal: Negative for abdominal pain, blood in stool, constipation, diarrhea, nausea and vomiting. Genitourinary: Negative for dysuria, frequency, hematuria and urgency. Musculoskeletal: Negative for back pain, joint pain and myalgias. Right knee pain   Skin: Negative for rash. Neurological: Negative for dizziness, tremors, seizures and headaches. Endo/Heme/Allergies: Negative for polydipsia. Does not bruise/bleed easily. Psychiatric/Behavioral: Negative for depression and substance abuse. The patient is not nervous/anxious. Physical Exam   Constitutional: He is oriented to person, place, and time. He appears well-developed and well-nourished. HENT:   Head: Normocephalic and atraumatic. Right Ear: External ear normal.   Mouth/Throat: Oropharynx is clear and moist. No oropharyngeal exudate. Eyes: Pupils are equal, round, and reactive to light. Conjunctivae and EOM are normal. No scleral icterus. Neck: Normal range of motion. Neck supple. No JVD present. No thyromegaly present. Cardiovascular: Normal rate, regular rhythm, normal heart sounds and intact distal pulses. No murmur heard. Pulmonary/Chest: Effort normal and breath sounds normal. He has no wheezes. Abdominal: Soft. Bowel sounds are normal. He exhibits no distension and no mass. Musculoskeletal: Normal range of motion. He exhibits no edema. Right knee: He exhibits normal range of motion, no swelling, no effusion, no ecchymosis, no deformity, no laceration, no erythema, normal alignment, no LCL laxity, normal patellar mobility, no bony tenderness, normal meniscus and no MCL laxity. Tenderness found. Medial joint line tenderness noted. Lymphadenopathy:     He has no cervical adenopathy. Neurological: He is alert and oriented to person, place, and time. He has normal reflexes. No cranial nerve deficit. Skin: Skin is warm and dry. No rash noted. He is not diaphoretic. Psychiatric: He has a normal mood and affect. Nursing note and vitals reviewed. ASSESSMENT and PLAN  Diagnoses and all orders for this visit:    1. Patellofemoral syndrome of right knee  -     diclofenac EC (VOLTAREN) 75 mg EC tablet; Take 1 Tab by mouth two (2) times a day. 2. Encounter for immunization  -     INFLUENZA VIRUS VAC QUAD,SPLIT,PRESV FREE SYRINGE IM  -     WI IMMUNIZ ADMIN,1 SINGLE/COMB VAC/TOXOID    discussed knee exercises and print out was given  Discussed lifestyle issues and health guidance given  Patient was given an after visit summary which includes diagnoses, vital signs, current medications, instructions and references & authorized prescriptions .  Results of labs will be conveyed to patient, once available. Pt verbalized instructions I provided and expressed understanding of discussion that was held today.

## 2020-01-22 ENCOUNTER — OFFICE VISIT (OUTPATIENT)
Dept: FAMILY MEDICINE CLINIC | Age: 55
End: 2020-01-22

## 2020-01-22 VITALS
HEART RATE: 84 BPM | TEMPERATURE: 97.7 F | SYSTOLIC BLOOD PRESSURE: 112 MMHG | BODY MASS INDEX: 25.18 KG/M2 | OXYGEN SATURATION: 98 % | DIASTOLIC BLOOD PRESSURE: 65 MMHG | HEIGHT: 69 IN | WEIGHT: 170 LBS | RESPIRATION RATE: 18 BRPM

## 2020-01-22 DIAGNOSIS — R51.9 SINUS HEADACHE: ICD-10-CM

## 2020-01-22 DIAGNOSIS — E78.2 MIXED HYPERLIPIDEMIA: ICD-10-CM

## 2020-01-22 DIAGNOSIS — H54.3 DECREASED VISION IN BOTH EYES: ICD-10-CM

## 2020-01-22 DIAGNOSIS — I10 ESSENTIAL HYPERTENSION: Primary | ICD-10-CM

## 2020-01-22 RX ORDER — MINERAL OIL
180 ENEMA (ML) RECTAL
Qty: 30 TAB | Refills: 0 | Status: SHIPPED | OUTPATIENT
Start: 2020-01-22 | End: 2020-02-17 | Stop reason: SDUPTHER

## 2020-01-22 RX ORDER — MELOXICAM 15 MG/1
15 TABLET ORAL
Qty: 30 TAB | Refills: 0 | Status: SHIPPED | OUTPATIENT
Start: 2020-01-22 | End: 2020-02-19 | Stop reason: SDUPTHER

## 2020-01-22 RX ORDER — VALSARTAN 40 MG/1
40 TABLET ORAL DAILY
Qty: 90 TAB | Refills: 1 | Status: SHIPPED | OUTPATIENT
Start: 2020-01-22 | End: 2020-03-19 | Stop reason: SDUPTHER

## 2020-01-22 NOTE — PATIENT INSTRUCTIONS

## 2020-01-22 NOTE — PROGRESS NOTES
Chief Complaint   Patient presents with    Headache     Patient isin the office today with complaints of headaches for three days. 1. Have you been to the ER, urgent care clinic since your last visit? Hospitalized since your last visit? No    2. Have you seen or consulted any other health care providers outside of the [address] [place] [address] since your last visit? Include any pap smears or colon screening.  No

## 2020-01-22 NOTE — PROGRESS NOTES
HISTORY OF PRESENT ILLNESS  Yusra Sinha is a 47 y.o. male. seen today for follow up on HTN,dyslipidemia, concern of new headaches  His right knee pain has improved significantly  HPI  Cardiovascular Review  The patient has hypertension and hyperlipidemia. He reports taking medications as instructed, no medication side effects noted, no chest pain on exertion, no dyspnea on exertion, no swelling of ankles, no orthostatic dizziness or lightheadedness, no orthopnea or paroxysmal nocturnal dyspnea, no palpitations, no erectile dysfunction, no muscle aches or pain. Diet and Lifestyle: generally follows a low fat low cholesterol diet, generally follows a low sodium diet, exercises regularly. Lab review: labs reviewed and discussed with patient. Medicines: Valsartan 40 mg, Zetia 10 mg  Lab Results   Component Value Date/Time    Cholesterol, total 201 (H) 01/23/2019 09:11 AM    HDL Cholesterol 37 (L) 01/23/2019 09:11 AM    LDL, calculated 139 (H) 01/23/2019 09:11 AM    VLDL, calculated 25 01/23/2019 09:11 AM    Triglyceride 127 01/23/2019 09:11 AM    CHOL/HDL Ratio 6.1 (H) 10/28/2009 09:20 AM     Neurological Review  He is here today to talk about headaches. This is a new problem. Episodes are occuring several times per month and duration of individual headaches are gradual and a few hours   The pain is described as dull pain, bilateral in the frontal area, bilateral in the temporal area. Associated symptoms: congestion, facial pain and decreased vision and eye strain. Denies fever, flashing lights, fluid retention, light sensitivity, loss of vision, motor impairment, nausea and vomiting Precipitating factors: patient is aware of none. He denies a history of recent head injury. Treatments for headaches currently include: unable to obtain relief with OTC meds. He has headache during night also and also in morning when he wakes up in morning.       Review of Systems   Constitutional: Negative for chills, fever and malaise/fatigue. HENT: Positive for congestion. Negative for ear pain, sore throat and tinnitus. Eyes: Negative for blurred vision, double vision, pain and discharge. Decreased vision   Respiratory: Negative for cough, shortness of breath and wheezing. Cardiovascular: Negative for chest pain, palpitations and leg swelling. Gastrointestinal: Negative for abdominal pain, blood in stool, constipation, diarrhea, nausea and vomiting. Genitourinary: Negative for dysuria, frequency, hematuria and urgency. Musculoskeletal: Negative for back pain, joint pain and myalgias. Skin: Negative for rash. Neurological: Positive for headaches. Negative for dizziness, tremors and seizures. Endo/Heme/Allergies: Negative for polydipsia. Does not bruise/bleed easily. Psychiatric/Behavioral: Negative for depression and substance abuse. The patient is not nervous/anxious. Physical Exam  Vitals signs and nursing note reviewed. Constitutional:       General: He is not in acute distress. Appearance: He is well-developed. He is not diaphoretic. HENT:      Head: Normocephalic and atraumatic. Right Ear: Tympanic membrane and external ear normal. No decreased hearing noted. No drainage. No middle ear effusion. Tympanic membrane is not injected. Left Ear: Tympanic membrane normal. No decreased hearing noted. No drainage. No middle ear effusion. Tympanic membrane is not injected. Nose: Mucosal edema present. No rhinorrhea. Right Sinus: Maxillary sinus tenderness and frontal sinus tenderness present. Left Sinus: Maxillary sinus tenderness and frontal sinus tenderness present. Mouth/Throat:      Pharynx: Uvula midline. Posterior oropharyngeal erythema present. No oropharyngeal exudate. Eyes:      General: No scleral icterus. Conjunctiva/sclera: Conjunctivae normal.      Pupils: Pupils are equal, round, and reactive to light.    Neck:      Musculoskeletal: Normal range of motion and neck supple. Thyroid: No thyromegaly. Vascular: No JVD. Cardiovascular:      Rate and Rhythm: Normal rate and regular rhythm. Heart sounds: Normal heart sounds. No murmur. Pulmonary:      Effort: Pulmonary effort is normal.      Breath sounds: Normal breath sounds. No wheezing or rales. Abdominal:      General: Bowel sounds are normal. There is no distension. Palpations: Abdomen is soft. There is no mass. Musculoskeletal: Normal range of motion. General: No tenderness. Lymphadenopathy:      Head:      Right side of head: No tonsillar adenopathy. Left side of head: No tonsillar adenopathy. Cervical: No cervical adenopathy. Skin:     General: Skin is warm and dry. Findings: No rash. Neurological:      Mental Status: He is alert and oriented to person, place, and time. Cranial Nerves: No cranial nerve deficit. Deep Tendon Reflexes: Reflexes are normal and symmetric. Reflexes normal.      Comments: Intact visual fields. Fundi are benign. PERRL, EOM's full, no nystagmus, no ptosis. Facial sensation is normal. Facial movement is symmetric. Palate is midline. Normal sternocleidomastoid strength. Tongue is midline. Hearing is intact bilaterally. ASSESSMENT and PLAN  Diagnoses and all orders for this visit:    1. Essential hypertension  -     valsartan (DIOVAN) 40 mg tablet; Take 1 Tab by mouth daily. For blood pressure  -     METABOLIC PANEL, COMPREHENSIVE    2. Mixed hyperlipidemia  -     METABOLIC PANEL, COMPREHENSIVE  -     LIPID PANEL    3. Decreased vision in both eyes  -     REFERRAL TO OPHTHALMOLOGY    4. Sinus headache  -     fexofenadine (ALLEGRA) 180 mg tablet; Take 1 Tab by mouth nightly. -     meloxicam (MOBIC) 15 mg tablet; Take 1 Tab by mouth nightly.     headaches likely sinus headaches vs eye strain   Discussed lifestyle issues and health guidance given  Patient was given an after visit summary which includes diagnoses, vital signs, current medications, instructions and references & authorized prescriptions . Results of labs will be conveyed to patient, once available. Pt verbalized instructions I provided and expressed understanding of discussion that was held today. Follow-up and Dispositions    · Return in about 4 months (around 5/22/2020).

## 2020-01-25 LAB
ALBUMIN SERPL-MCNC: 4.3 G/DL (ref 3.8–4.9)
ALBUMIN/GLOB SERPL: 1.5 {RATIO} (ref 1.2–2.2)
ALP SERPL-CCNC: 88 IU/L (ref 39–117)
ALT SERPL-CCNC: 23 IU/L (ref 0–44)
AST SERPL-CCNC: 18 IU/L (ref 0–40)
BILIRUB SERPL-MCNC: 0.4 MG/DL (ref 0–1.2)
BUN SERPL-MCNC: 12 MG/DL (ref 6–24)
BUN/CREAT SERPL: 12 (ref 9–20)
CALCIUM SERPL-MCNC: 9.3 MG/DL (ref 8.7–10.2)
CHLORIDE SERPL-SCNC: 101 MMOL/L (ref 96–106)
CHOLEST SERPL-MCNC: 172 MG/DL (ref 100–199)
CO2 SERPL-SCNC: 21 MMOL/L (ref 20–29)
CREAT SERPL-MCNC: 1.04 MG/DL (ref 0.76–1.27)
GLOBULIN SER CALC-MCNC: 2.8 G/DL (ref 1.5–4.5)
GLUCOSE SERPL-MCNC: 96 MG/DL (ref 65–99)
HDLC SERPL-MCNC: 30 MG/DL
INTERPRETATION, 910389: NORMAL
LDLC SERPL CALC-MCNC: 100 MG/DL (ref 0–99)
POTASSIUM SERPL-SCNC: 4.6 MMOL/L (ref 3.5–5.2)
PROT SERPL-MCNC: 7.1 G/DL (ref 6–8.5)
SODIUM SERPL-SCNC: 138 MMOL/L (ref 134–144)
TRIGL SERPL-MCNC: 208 MG/DL (ref 0–149)
VLDLC SERPL CALC-MCNC: 42 MG/DL (ref 5–40)

## 2020-01-25 NOTE — PROGRESS NOTES
Please inform patient,  Sherry Nieves results are normal  Cholesterol results show  Normal total and bad cholesterol, but still Triglyceride result is up, to take Zetia daily and watch diet strictly  thanks

## 2020-01-27 NOTE — PROGRESS NOTES
Outbound call to patient. Name and  verified. Reviewed recent labs with patient. Advised of provider recommendation.

## 2020-02-17 ENCOUNTER — OFFICE VISIT (OUTPATIENT)
Dept: FAMILY MEDICINE CLINIC | Age: 55
End: 2020-02-17

## 2020-02-17 VITALS
OXYGEN SATURATION: 98 % | DIASTOLIC BLOOD PRESSURE: 81 MMHG | HEIGHT: 69 IN | TEMPERATURE: 97.9 F | SYSTOLIC BLOOD PRESSURE: 124 MMHG | BODY MASS INDEX: 24.14 KG/M2 | RESPIRATION RATE: 18 BRPM | WEIGHT: 163 LBS | HEART RATE: 81 BPM

## 2020-02-17 DIAGNOSIS — M75.82 PECTORALIS MAJOR TENDONITIS, LEFT: ICD-10-CM

## 2020-02-17 DIAGNOSIS — M75.82 ROTATOR CUFF TENDINITIS, LEFT: ICD-10-CM

## 2020-02-17 DIAGNOSIS — R07.89 CHEST PAIN, MUSCULOSKELETAL: ICD-10-CM

## 2020-02-17 DIAGNOSIS — R07.9 CHEST PAIN, UNSPECIFIED TYPE: Primary | ICD-10-CM

## 2020-02-17 DIAGNOSIS — R51.9 SINUS HEADACHE: ICD-10-CM

## 2020-02-17 RX ORDER — MINERAL OIL
180 ENEMA (ML) RECTAL
Qty: 30 TAB | Refills: 0 | Status: SHIPPED | OUTPATIENT
Start: 2020-02-17 | End: 2020-06-29 | Stop reason: SDUPTHER

## 2020-02-17 NOTE — PROGRESS NOTES
HISTORY OF PRESENT ILLNESS  Deya Chairez is a 47 y.o. male. seen for left side chest wall pain,radiating to left shoulder and left arm. Pain is worst with certain shoulder movements. HPI  Chest Pain  Patient complains of chest pain. Onset was 2 weeks ago, with unchanged course since that time. The patient admits to chest discomfort that is intermittent, with radiation to left arm and left shoulder, rated as a scale of 5/10 in intensity that is precordial, costochondral in nature. Associated symptoms are left shoulder joint pain. Aggravating factors are palpation or certain movements. Alleviating factors are: nothing. Patient's cardiac risk factors are dyslipidemia, male gender, hypertension. Patient's risk factors for DVT/PE: none. Previous cardiac testing includes: LDL, HbA1c (average sugar test). Shoulder Pain  Patient complains of left side shoulder pain. The symptoms began several days ago Course of symptoms since onset has been well controlled. Pain is described as location: glenohumeral region, worse with overhead movements and other associated symptoms: pain radiating to arm/hand. Symptoms were incited by no known event. Patient denies fever. Therapy to date includes none. Review of Systems   Constitutional: Negative for chills, fever and malaise/fatigue. HENT: Negative for congestion, ear pain, sore throat and tinnitus. Eyes: Negative for blurred vision, double vision, pain and discharge. Respiratory: Negative for cough, shortness of breath and wheezing. Cardiovascular: Positive for chest pain. Negative for palpitations and leg swelling. Gastrointestinal: Negative for abdominal pain, blood in stool, constipation, diarrhea, nausea and vomiting. Genitourinary: Negative for dysuria, frequency, hematuria and urgency. Musculoskeletal: Negative for back pain, joint pain and myalgias. Left shoulder pain   Skin: Negative for rash.    Neurological: Negative for dizziness, tremors, seizures and headaches. Endo/Heme/Allergies: Negative for polydipsia. Does not bruise/bleed easily. Psychiatric/Behavioral: Negative for depression and substance abuse. The patient is not nervous/anxious. Physical Exam  Vitals signs and nursing note reviewed. Constitutional:       Appearance: He is well-developed. He is not diaphoretic. HENT:      Head: Normocephalic and atraumatic. Right Ear: External ear normal.      Mouth/Throat:      Pharynx: No oropharyngeal exudate. Eyes:      General: No scleral icterus. Conjunctiva/sclera: Conjunctivae normal.      Pupils: Pupils are equal, round, and reactive to light. Neck:      Musculoskeletal: Normal range of motion and neck supple. Thyroid: No thyromegaly. Vascular: No JVD. Cardiovascular:      Rate and Rhythm: Normal rate and regular rhythm. Heart sounds: Normal heart sounds. No murmur. Pulmonary:      Effort: Pulmonary effort is normal.      Breath sounds: Normal breath sounds. No wheezing. Chest:       Abdominal:      General: Bowel sounds are normal. There is no distension. Palpations: Abdomen is soft. There is no mass. Musculoskeletal: Normal range of motion. General: No tenderness. Comments: Left Shoulder- Empty Can Test: Positive. Drop Arm Test: Positive. Cross-Chest Test: Positive. NEER test Positive  Rao' test Positive     Lymphadenopathy:      Cervical: No cervical adenopathy. Skin:     General: Skin is warm and dry. Findings: No rash. Neurological:      Mental Status: He is alert and oriented to person, place, and time. Cranial Nerves: No cranial nerve deficit. Deep Tendon Reflexes: Reflexes are normal and symmetric. Reflexes normal.         ASSESSMENT and PLAN  Diagnoses and all orders for this visit:    1. Chest pain, unspecified type  -     AMB POC EKG ROUTINE W/ 12 LEADS, INTER & REP    2. Chest pain, musculoskeletal    3. Pectoralis major tendonitis, left    4. Rotator cuff tendinitis, left    EKG normal and very reassuring. Pain likely from shoulder tendonitis and pectoralis tendonitis. Discussed shoulder stretches  Reassured  Discussed lifestyle issues and health guidance given  Patient was given an after visit summary which includes diagnoses, vital signs, current medications, instructions and references & authorized prescriptions . Results of labs will be conveyed to patient, once available. Pt verbalized instructions I provided and expressed understanding of discussion that was held today. Follow-up and Dispositions    · Return if symptoms worsen or fail to improve.

## 2020-02-17 NOTE — PROGRESS NOTES
Chief Complaint   Patient presents with    Chest Pain     Patient is in the office today with complaints of left arm pain and chest pain. 1. Have you been to the ER, urgent care clinic since your last visit? Hospitalized since your last visit? No    2. Have you seen or consulted any other health care providers outside of the [address] [place] [address] since your last visit? Include any pap smears or colon screening.  No

## 2020-02-19 DIAGNOSIS — R51.9 SINUS HEADACHE: ICD-10-CM

## 2020-02-19 RX ORDER — MELOXICAM 15 MG/1
15 TABLET ORAL
Qty: 30 TAB | Refills: 0 | Status: SHIPPED | OUTPATIENT
Start: 2020-02-19 | End: 2020-06-29 | Stop reason: SDUPTHER

## 2020-03-09 DIAGNOSIS — I10 ESSENTIAL HYPERTENSION: ICD-10-CM

## 2020-03-09 DIAGNOSIS — E78.2 MIXED HYPERLIPIDEMIA: ICD-10-CM

## 2020-03-17 NOTE — TELEPHONE ENCOUNTER
----- Message from Elwood Kussmaul sent at 3/17/2020 11:46 AM EDT -----  Regarding: Dr. Adam Mcdaniel first and last name: Ari Raygoza  Reason for call:   f/up on losartan and other medication Rx because the pharmacy had not received anything from the office.    Callback required yes/no and why: yes  Best contact number(s):  [phone]  Details to clarify the request:

## 2020-03-19 RX ORDER — VALSARTAN 40 MG/1
40 TABLET ORAL DAILY
Qty: 90 TAB | Refills: 1 | Status: SHIPPED | OUTPATIENT
Start: 2020-03-19 | End: 2020-09-24 | Stop reason: SDUPTHER

## 2020-03-19 RX ORDER — EZETIMIBE 10 MG/1
10 TABLET ORAL DAILY
Qty: 30 TAB | Refills: 5 | Status: SHIPPED | OUTPATIENT
Start: 2020-03-19 | End: 2020-09-19 | Stop reason: SDUPTHER

## 2020-06-29 DIAGNOSIS — R51.9 SINUS HEADACHE: ICD-10-CM

## 2020-06-29 RX ORDER — MELOXICAM 15 MG/1
15 TABLET ORAL
Qty: 30 TAB | Refills: 3 | Status: SHIPPED | OUTPATIENT
Start: 2020-06-29 | End: 2021-04-19 | Stop reason: ALTCHOICE

## 2020-06-29 RX ORDER — MINERAL OIL
180 ENEMA (ML) RECTAL
Qty: 30 TAB | Refills: 3 | Status: SHIPPED | OUTPATIENT
Start: 2020-06-29 | End: 2021-03-23

## 2020-06-29 NOTE — TELEPHONE ENCOUNTER
Last Visit: 2/17/20  MD Elo Antoine  Next Appointment: Not scheduled  Previous Refill Encounter(s):   Fexofenadine 2/17/20 #30  meloxicam 2/19/20  #30    Requested Prescriptions     Pending Prescriptions Disp Refills    fexofenadine (ALLEGRA) 180 mg tablet 30 Tab 3     Sig: Take 1 Tab by mouth nightly.  meloxicam (MOBIC) 15 mg tablet 30 Tab 3     Sig: Take 1 Tab by mouth nightly.

## 2020-09-18 DIAGNOSIS — E78.2 MIXED HYPERLIPIDEMIA: ICD-10-CM

## 2020-09-18 NOTE — TELEPHONE ENCOUNTER
Chief Complaint   Patient presents with    Medication Refill     Zetia     Patient scheduled appointment is 9/24/2020, but patient is completely out of medication.

## 2020-09-18 NOTE — TELEPHONE ENCOUNTER
MD María Barragan,    Patient call with request for refill of ezetimibe. Thanks, Julissa Potts    Last Visit: 2/17/20  MD María Barragan  Next Appointment: 9/24/20  MD María Barragan  Previous Refill Encounter(s): 3/19/20  30 + 5    Requested Prescriptions     Pending Prescriptions Disp Refills    ezetimibe (ZETIA) 10 mg tablet 30 Tab 5     Sig: Take 1 Tab by mouth daily.  For cholesterol

## 2020-09-19 RX ORDER — EZETIMIBE 10 MG/1
10 TABLET ORAL DAILY
Qty: 30 TAB | Refills: 0 | Status: SHIPPED | OUTPATIENT
Start: 2020-09-19 | End: 2020-09-24 | Stop reason: SDUPTHER

## 2020-09-24 ENCOUNTER — OFFICE VISIT (OUTPATIENT)
Dept: FAMILY MEDICINE CLINIC | Age: 55
End: 2020-09-24
Payer: COMMERCIAL

## 2020-09-24 VITALS
SYSTOLIC BLOOD PRESSURE: 100 MMHG | WEIGHT: 165 LBS | TEMPERATURE: 98.4 F | HEIGHT: 69 IN | DIASTOLIC BLOOD PRESSURE: 63 MMHG | BODY MASS INDEX: 24.44 KG/M2 | RESPIRATION RATE: 18 BRPM | HEART RATE: 73 BPM | OXYGEN SATURATION: 98 %

## 2020-09-24 DIAGNOSIS — E78.2 MIXED HYPERLIPIDEMIA: Primary | ICD-10-CM

## 2020-09-24 DIAGNOSIS — R51.9 SINUS HEADACHE: ICD-10-CM

## 2020-09-24 DIAGNOSIS — I10 ESSENTIAL HYPERTENSION: ICD-10-CM

## 2020-09-24 DIAGNOSIS — Z23 ENCOUNTER FOR IMMUNIZATION: ICD-10-CM

## 2020-09-24 DIAGNOSIS — H81.13 BENIGN PAROXYSMAL POSITIONAL VERTIGO DUE TO BILATERAL VESTIBULAR DISORDER: ICD-10-CM

## 2020-09-24 PROCEDURE — 90686 IIV4 VACC NO PRSV 0.5 ML IM: CPT

## 2020-09-24 PROCEDURE — 99214 OFFICE O/P EST MOD 30 MIN: CPT | Performed by: FAMILY MEDICINE

## 2020-09-24 PROCEDURE — 90471 IMMUNIZATION ADMIN: CPT

## 2020-09-24 RX ORDER — VALSARTAN 40 MG/1
40 TABLET ORAL DAILY
Qty: 90 TAB | Refills: 1 | Status: SHIPPED | OUTPATIENT
Start: 2020-09-24 | End: 2021-04-19 | Stop reason: ALTCHOICE

## 2020-09-24 RX ORDER — EZETIMIBE 10 MG/1
10 TABLET ORAL DAILY
Qty: 90 TAB | Refills: 1 | Status: SHIPPED | OUTPATIENT
Start: 2020-09-24 | End: 2020-10-24

## 2020-09-24 NOTE — PROGRESS NOTES
HISTORY OF PRESENT ILLNESS  Brant Kessler is a 54 y.o. male. Seen today for follow up on HTN,dyslipidemia and on and off headaches and dizziness  HPI  Cardiovascular Review  The patient has hypertension and hyperlipidemia. He reports taking medications as instructed, no medication side effects noted, no chest pain on exertion, no dyspnea on exertion, no swelling of ankles, no orthostatic dizziness or lightheadedness, no orthopnea or paroxysmal nocturnal dyspnea, no palpitations, no erectile dysfunction, no muscle aches or pain. Diet and Lifestyle: generally follows a low fat low cholesterol diet, generally follows a low sodium diet, exercises regularly. Lab review: labs reviewed and discussed with patient. Medicines: Valsartan 40 mg, Zetia 10 mg  Lab Results   Component Value Date/Time    Cholesterol, total 172 01/24/2020 09:44 AM    HDL Cholesterol 30 (L) 01/24/2020 09:44 AM    LDL, calculated 100 (H) 01/24/2020 09:44 AM    VLDL, calculated 42 (H) 01/24/2020 09:44 AM    Triglyceride 208 (H) 01/24/2020 09:44 AM    CHOL/HDL Ratio 6.1 (H) 10/28/2009 09:20 AM       Neurological Review  He is here today to talk about headaches. This is a chronic problem. Episodes are occuring several times per month and duration of individual headaches are gradual and a few hours   The pain is described as dull pain, bilateral in the frontal area, bilateral in the temporal area. Associated symptoms: congestion, facial pain and decreased vision and eye strain. Denies fever, flashing lights, fluid retention, light sensitivity, loss of vision, motor impairment, nausea and vomiting Precipitating factors: patient is aware of none. He denies a history of recent head injury. Treatments for headaches currently include: antihistamine and Meloxicam.    He has headache during night also and also in morning when he wakes up in morning. Dizziness  Patient complains of motion without rotation, unsteadiness.   The symptoms started a few days ago and are gradually improving. The attacks occur every a few hours and last a fewseconds. Positions that worsen symptoms: bending over, head back, turning head, standing up. Previous workup/treatments: none. Associated ear symptoms: aural pressure bilaterally which is stable. Associated CNS symptoms: headaches. Recent infections: allergies. Head trauma: denied. Drug ingestion: none Noise exposure: no occupational exposure. Family history: non-contributory      Review of Systems   Constitutional: Negative for chills, fever and malaise/fatigue. HENT: Negative for congestion, ear pain, sore throat and tinnitus. Eyes: Negative for blurred vision, double vision, pain and discharge. Respiratory: Negative for cough, shortness of breath and wheezing. Cardiovascular: Negative for chest pain, palpitations and leg swelling. Gastrointestinal: Negative for abdominal pain, blood in stool, constipation, diarrhea, nausea and vomiting. Bloating   Genitourinary: Negative for dysuria, frequency, hematuria and urgency. Musculoskeletal: Negative for back pain, joint pain and myalgias. Skin: Negative for rash. Neurological: Negative for dizziness, tremors, seizures and headaches. Endo/Heme/Allergies: Negative for polydipsia. Does not bruise/bleed easily. Psychiatric/Behavioral: Negative for depression and substance abuse. The patient is not nervous/anxious. Physical Exam  Vitals signs and nursing note reviewed. Constitutional:       Appearance: He is well-developed. He is not diaphoretic. HENT:      Head: Normocephalic and atraumatic. Right Ear: External ear normal.      Mouth/Throat:      Pharynx: No oropharyngeal exudate. Eyes:      General: No scleral icterus. Conjunctiva/sclera: Conjunctivae normal.      Pupils: Pupils are equal, round, and reactive to light. Neck:      Musculoskeletal: Normal range of motion and neck supple. Thyroid: No thyromegaly.       Vascular: No JVD.   Cardiovascular:      Rate and Rhythm: Normal rate and regular rhythm. Heart sounds: Normal heart sounds. No murmur. Pulmonary:      Effort: Pulmonary effort is normal.      Breath sounds: Normal breath sounds. No wheezing. Abdominal:      General: Bowel sounds are normal. There is no distension. Palpations: Abdomen is soft. There is no mass. Musculoskeletal: Normal range of motion. General: No tenderness. Lymphadenopathy:      Cervical: No cervical adenopathy. Skin:     General: Skin is warm and dry. Findings: No rash. Neurological:      Mental Status: He is alert and oriented to person, place, and time. Cranial Nerves: No cranial nerve deficit. Deep Tendon Reflexes: Reflexes are normal and symmetric. Reflexes normal.      Comments: Intact visual fields. Fundi are benign. PERRL, EOM's full, no nystagmus, no ptosis. Facial sensation is normal. Facial movement is symmetric. Palate is midline. Normal sternocleidomastoid strength. Tongue is midline. Hearing is intact bilaterally. ASSESSMENT and PLAN  Diagnoses and all orders for this visit:    1. Mixed hyperlipidemia  -     ezetimibe (ZETIA) 10 mg tablet; Take 1 Tab by mouth daily for 30 days. For cholesterol  -     METABOLIC PANEL, COMPREHENSIVE  -     LIPID PANEL    2. Essential hypertension  -     valsartan (DIOVAN) 40 mg tablet; Take 1 Tab by mouth daily. For blood pressure  -     METABOLIC PANEL, COMPREHENSIVE    3. Sinus headache    4. Benign paroxysmal positional vertigo due to bilateral vestibular disorder    5. Encounter for immunization  -     INFLUENZA VIRUS VAC QUAD,SPLIT,PRESV FREE SYRINGE IM    reassured for vertigo and recommended to continue Allegra and discussed Vertigo exercises  Discussed lifestyle issues and health guidance given  Patient was given an after visit summary which includes diagnoses, vital signs, current medications, instructions and references & authorized prescriptions . Results of labs will be conveyed to patient, once available. Pt verbalized instructions I provided and expressed understanding of discussion that was held today. Follow-up and Dispositions    · Return in about 6 months (around 3/24/2021).

## 2020-09-24 NOTE — PROGRESS NOTES
Chief Complaint   Patient presents with    Cholesterol Problem    Hypertension    Headache     1. Have you been to the ER, urgent care clinic since your last visit? Hospitalized since your last visit? No    2. Have you seen or consulted any other health care providers outside of the [address] [place] [address] since your last visit? Include any pap smears or colon screening.  No

## 2020-09-24 NOTE — PATIENT INSTRUCTIONS
DASH Diet: Care Instructions Your Care Instructions The DASH diet is an eating plan that can help lower your blood pressure. DASH stands for Dietary Approaches to Stop Hypertension. Hypertension is high blood pressure. The DASH diet focuses on eating foods that are high in calcium, potassium, and magnesium. These nutrients can lower blood pressure. The foods that are highest in these nutrients are fruits, vegetables, low-fat dairy products, nuts, seeds, and legumes. But taking calcium, potassium, and magnesium supplements instead of eating foods that are high in those nutrients does not have the same effect. The DASH diet also includes whole grains, fish, and poultry. The DASH diet is one of several lifestyle changes your doctor may recommend to lower your high blood pressure. Your doctor may also want you to decrease the amount of sodium in your diet. Lowering sodium while following the DASH diet can lower blood pressure even further than just the DASH diet alone. Follow-up care is a key part of your treatment and safety. Be sure to make and go to all appointments, and call your doctor if you are having problems. It's also a good idea to know your test results and keep a list of the medicines you take. How can you care for yourself at home? Following the DASH diet · Eat 4 to 5 servings of fruit each day. A serving is 1 medium-sized piece of fruit, ½ cup chopped or canned fruit, 1/4 cup dried fruit, or 4 ounces (½ cup) of fruit juice. Choose fruit more often than fruit juice. · Eat 4 to 5 servings of vegetables each day. A serving is 1 cup of lettuce or raw leafy vegetables, ½ cup of chopped or cooked vegetables, or 4 ounces (½ cup) of vegetable juice. Choose vegetables more often than vegetable juice. · Get 2 to 3 servings of low-fat and fat-free dairy each day. A serving is 8 ounces of milk, 1 cup of yogurt, or 1 ½ ounces of cheese. · Eat 6 to 8 servings of grains each day. A serving is 1 slice of bread, 1 ounce of dry cereal, or ½ cup of cooked rice, pasta, or cooked cereal. Try to choose whole-grain products as much as possible. · Limit lean meat, poultry, and fish to 2 servings each day. A serving is 3 ounces, about the size of a deck of cards. · Eat 4 to 5 servings of nuts, seeds, and legumes (cooked dried beans, lentils, and split peas) each week. A serving is 1/3 cup of nuts, 2 tablespoons of seeds, or ½ cup of cooked beans or peas. · Limit fats and oils to 2 to 3 servings each day. A serving is 1 teaspoon of vegetable oil or 2 tablespoons of salad dressing. · Limit sweets and added sugars to 5 servings or less a week. A serving is 1 tablespoon jelly or jam, ½ cup sorbet, or 1 cup of lemonade. · Eat less than 2,300 milligrams (mg) of sodium a day. If you limit your sodium to 1,500 mg a day, you can lower your blood pressure even more. Tips for success · Start small. Do not try to make dramatic changes to your diet all at once. You might feel that you are missing out on your favorite foods and then be more likely to not follow the plan. Make small changes, and stick with them. Once those changes become habit, add a few more changes. · Try some of the following: ? Make it a goal to eat a fruit or vegetable at every meal and at snacks. This will make it easy to get the recommended amount of fruits and vegetables each day. ? Try yogurt topped with fruit and nuts for a snack or healthy dessert. ? Add lettuce, tomato, cucumber, and onion to sandwiches. ? Combine a ready-made pizza crust with low-fat mozzarella cheese and lots of vegetable toppings. Try using tomatoes, squash, spinach, broccoli, carrots, cauliflower, and onions. ? Have a variety of cut-up vegetables with a low-fat dip as an appetizer instead of chips and dip. ? Sprinkle sunflower seeds or chopped almonds over salads.  Or try adding chopped walnuts or almonds to cooked vegetables. ? Try some vegetarian meals using beans and peas. Add garbanzo or kidney beans to salads. Make burritos and tacos with mashed miller beans or black beans. Where can you learn more? Go to http://www.gray.com/ Enter D030 in the search box to learn more about \"DASH Diet: Care Instructions. \" Current as of: December 16, 2019               Content Version: 12.6 © 2436-1178 [employer]. Care instructions adapted under license by [employer] (which disclaims liability or warranty for this information). If you have questions about a medical condition or this instruction, always ask your healthcare professional. [address] any warranty or liability for your use of this information.

## 2020-09-25 LAB
ALBUMIN SERPL-MCNC: 4.8 G/DL (ref 3.8–4.9)
ALBUMIN/GLOB SERPL: 1.6 {RATIO} (ref 1.2–2.2)
ALP SERPL-CCNC: 87 IU/L (ref 39–117)
ALT SERPL-CCNC: 19 IU/L (ref 0–44)
AST SERPL-CCNC: 20 IU/L (ref 0–40)
BILIRUB SERPL-MCNC: 0.5 MG/DL (ref 0–1.2)
BUN SERPL-MCNC: 16 MG/DL (ref 6–24)
BUN/CREAT SERPL: 14 (ref 9–20)
CALCIUM SERPL-MCNC: 9.3 MG/DL (ref 8.7–10.2)
CHLORIDE SERPL-SCNC: 101 MMOL/L (ref 96–106)
CHOLEST SERPL-MCNC: 211 MG/DL (ref 100–199)
CO2 SERPL-SCNC: 25 MMOL/L (ref 20–29)
CREAT SERPL-MCNC: 1.11 MG/DL (ref 0.76–1.27)
GLOBULIN SER CALC-MCNC: 3 G/DL (ref 1.5–4.5)
GLUCOSE SERPL-MCNC: 90 MG/DL (ref 65–99)
HDLC SERPL-MCNC: 36 MG/DL
INTERPRETATION, 910389: NORMAL
LDLC SERPL CALC-MCNC: 148 MG/DL (ref 0–99)
POTASSIUM SERPL-SCNC: 4.8 MMOL/L (ref 3.5–5.2)
PROT SERPL-MCNC: 7.8 G/DL (ref 6–8.5)
SODIUM SERPL-SCNC: 139 MMOL/L (ref 134–144)
TRIGL SERPL-MCNC: 149 MG/DL (ref 0–149)
VLDLC SERPL CALC-MCNC: 27 MG/DL (ref 5–40)

## 2020-09-25 NOTE — PROGRESS NOTES
Outbound call to patient name and  verified. Patient was advised and understands recent test results.

## 2020-09-25 NOTE — PROGRESS NOTES
Please inform patient,  Cholesterol results are up again, mainly bad cholesterol. Making sure he watch diet strictly along with taking medicine regularly.   Kidney and liver functions are normal  thanks

## 2020-09-28 ENCOUNTER — OFFICE VISIT (OUTPATIENT)
Dept: FAMILY MEDICINE CLINIC | Age: 55
End: 2020-09-28
Payer: COMMERCIAL

## 2020-09-28 VITALS
DIASTOLIC BLOOD PRESSURE: 66 MMHG | OXYGEN SATURATION: 98 % | WEIGHT: 164 LBS | HEIGHT: 69 IN | SYSTOLIC BLOOD PRESSURE: 116 MMHG | HEART RATE: 64 BPM | BODY MASS INDEX: 24.29 KG/M2 | TEMPERATURE: 97.7 F

## 2020-09-28 DIAGNOSIS — H81.13 BENIGN PAROXYSMAL POSITIONAL VERTIGO DUE TO BILATERAL VESTIBULAR DISORDER: Primary | ICD-10-CM

## 2020-09-28 PROCEDURE — 99213 OFFICE O/P EST LOW 20 MIN: CPT | Performed by: FAMILY MEDICINE

## 2020-09-28 RX ORDER — PREDNISONE 5 MG/1
5 TABLET ORAL 2 TIMES DAILY
Qty: 10 TAB | Refills: 0 | Status: SHIPPED | OUTPATIENT
Start: 2020-09-28 | End: 2021-04-19 | Stop reason: ALTCHOICE

## 2020-09-28 RX ORDER — MECLIZINE HYDROCHLORIDE 25 MG/1
25 TABLET ORAL 2 TIMES DAILY
Qty: 10 TAB | Refills: 0 | Status: SHIPPED | OUTPATIENT
Start: 2020-09-28 | End: 2020-10-03

## 2020-09-28 NOTE — PROGRESS NOTES
Chief Complaint   Patient presents with    Dizziness    Sleep Problem     1. Have you been to the ER, urgent care clinic since your last visit? Hospitalized since your last visit? No    2. Have you seen or consulted any other health care providers outside of the [address] [place] [address] since your last visit? Include any pap smears or colon screening.  No

## 2020-09-28 NOTE — PROGRESS NOTES
HISTORY OF PRESENT ILLNESS  Zaria Griffith is a 54 y.o. male. Seen today for dizzy spells for last 3 days. HPI  Dizziness  Patient complains of rotary vertigo, motion without rotation, symptoms while driving. The symptoms started a few days ago and are unchanged. The attacks occur every a few hours and last a fewseconds. Positions that worsen symptoms: bending over, head back, turning head, lying down. Previous workup/treatments: none. Associated ear symptoms: aural pressure bilaterally which is ongoing. Associated CNS symptoms: none. Recent infections: URI, sinus infection. Head trauma: denied. Drug ingestion: none Noise exposure: no occupational exposure. Family history: non-contributory     he took Claritin for allergies and that night he was not able to sleep. it made him awake whole night  Review of Systems   Constitutional: Negative for chills, fever and malaise/fatigue. HENT: Negative for congestion, ear pain, sore throat and tinnitus. Eyes: Negative for blurred vision, double vision, pain and discharge. Respiratory: Negative for cough, shortness of breath and wheezing. Cardiovascular: Negative for chest pain, palpitations and leg swelling. Gastrointestinal: Negative for abdominal pain, blood in stool, constipation, diarrhea, nausea and vomiting. Genitourinary: Negative for dysuria, frequency, hematuria and urgency. Musculoskeletal: Negative for back pain, joint pain and myalgias. Skin: Negative for rash. Neurological: Positive for dizziness. Negative for tremors, seizures and headaches. Endo/Heme/Allergies: Negative for polydipsia. Does not bruise/bleed easily. Psychiatric/Behavioral: Negative for depression and substance abuse. The patient has insomnia. The patient is not nervous/anxious. Physical Exam  Vitals signs and nursing note reviewed. Constitutional:       General: He is not in acute distress.   HENT:      Right Ear: Tympanic membrane normal. No decreased hearing noted. No drainage. No middle ear effusion. Tympanic membrane is not injected. Left Ear: Tympanic membrane normal. No decreased hearing noted. No drainage. No middle ear effusion. Tympanic membrane is not injected. Nose: Mucosal edema present. No rhinorrhea. Right Sinus: Maxillary sinus tenderness and frontal sinus tenderness present. Left Sinus: Maxillary sinus tenderness and frontal sinus tenderness present. Mouth/Throat:      Pharynx: Uvula midline. Posterior oropharyngeal erythema present. No oropharyngeal exudate. Cardiovascular:      Rate and Rhythm: Regular rhythm. Heart sounds: Normal heart sounds. No murmur. Pulmonary:      Effort: Pulmonary effort is normal.      Breath sounds: Normal breath sounds. No wheezing or rales. Lymphadenopathy:      Head:      Right side of head: No tonsillar adenopathy. Left side of head: No tonsillar adenopathy. Cervical: No cervical adenopathy. Neurological:      Comments: Intact visual fields. Fundi are benign. PERRL, EOM's full, no nystagmus, no ptosis. Facial sensation is normal. Facial movement is symmetric. Palate is midline. Normal sternocleidomastoid strength. Tongue is midline. Hearing is intact bilaterally. ASSESSMENT and PLAN  Diagnoses and all orders for this visit:    1. Benign paroxysmal positional vertigo due to bilateral vestibular disorder  -     meclizine (ANTIVERT) 25 mg tablet; Take 1 Tab by mouth two (2) times a day for 5 days. -     predniSONE (DELTASONE) 5 mg tablet; Take 1 Tab by mouth two (2) times a day. sx likely from allergies and vertigo  Discussed ddx  Discussed lifestyle issues and health guidance given  Patient was given an after visit summary which includes diagnoses, vital signs, current medications, instructions and references & authorized prescriptions . Results of labs will be conveyed to patient, once available.   Pt verbalized instructions I provided and expressed understanding of discussion that was held today.

## 2020-09-28 NOTE — PATIENT INSTRUCTIONS
Epley Maneuver at Home for Vertigo: Exercises  Introduction  Vertigo is a spinning or whirling sensation when you move your head. Your doctor may have moved you in different positions to help your vertigo get better faster. This is called the Epley maneuver. Your doctor also may have asked you to do these exercises at home. Do the exercises as often as your doctor recommends. If your vertigo is getting worse, your doctor may have you change the exercise or stop it. Step 1  Step 1   1. Sit on the edge of a bed or sofa. Step 2   1. Turn your head 45 degrees in the direction your doctor told you to. This should be toward the ear that causes the most vertigo for you. In this picture, the woman is turning toward her left ear. Step 3   1. Tilt yourself backward until you are lying on your back. Your head should still be at a 45-degree turn. Your head should be about midway between looking straight ahead and looking out to your side. Hold for 30 seconds. If you have vertigo, stay in this position until it stops. Step 4   1. Turn your head 90 degrees toward the ear that has the least vertigo. In this picture, the woman is turning to the right because she has vertigo on her left side. The point of your chin should be raised and over your shoulder. Hold for 30 seconds. Step 5   1. Roll onto the side with the least vertigo. You should now be looking at the floor. Hold for 30 seconds. Follow-up care is a key part of your treatment and safety. Be sure to make and go to all appointments, and call your doctor if you are having problems. It's also a good idea to know your test results and keep a list of the medicines you take. Where can you learn more? Go to http://ai-kaleigh.info/  Enter P834 in the search box to learn more about \"Epley Maneuver at Home for Vertigo: Exercises. \"  Current as of: November 20, 2019               Content Version: 12.6  © 3702-8965 [employer]. Care instructions adapted under license by [employer] (which disclaims liability or warranty for this information). If you have questions about a medical condition or this instruction, always ask your healthcare professional. [address] any warranty or liability for your use of this information. Benign Paroxysmal Positional Vertigo (BPPV): Care Instructions  Your Care Instructions     Benign paroxysmal positional vertigo, also called BPPV, is an inner ear problem. It causes a spinning or whirling sensation when you move your head. This sensation is called vertigo. The vertigo usually lasts for less than a minute. People often have vertigo spells for a few days or weeks. Then the vertigo goes away. But it may come back again. The vertigo may be mild, or it may be bad enough to cause unsteadiness, nausea, and vomiting. When you move, your inner ear sends messages to the brain. This helps you keep your balance. Vertigo can happen when debris builds up in the inner ear. The buildup can cause the inner ear to send the wrong message to the brain. Your doctor may move you in different positions to help your vertigo get better faster. This is called the Epley maneuver. Your doctor may also prescribe medicines or exercises to help with your symptoms. Follow-up care is a key part of your treatment and safety. Be sure to make and go to all appointments, and call your doctor if you are having problems. It's also a good idea to know your test results and keep a list of the medicines you take. How can you care for yourself at home? · If your doctor suggests that you do Melendrez-Daroff exercises:  ? Sit on the edge of a bed or sofa. Quickly lie down on the side that causes the worst vertigo. Lie on your side with your ear down. ? Stay in this position for at least 30 seconds or until the vertigo goes away. ? Sit up.  If this causes vertigo, wait for it to stop.  ? Repeat the procedure on the other side. ? Repeat this 10 times. Do these exercises 2 times a day until the vertigo is gone. When should you call for help? Call 911 anytime you think you may need emergency care. For example, call if:    · You have symptoms of a stroke. These may include:  ? Sudden numbness, tingling, weakness, or loss of movement in your face, arm, or leg, especially on only one side of your body. ? Sudden vision changes. ? Sudden trouble speaking. ? Sudden confusion or trouble understanding simple statements. ? Sudden problems with walking or balance. ? A sudden, severe headache that is different from past headaches. Call your doctor now or seek immediate medical care if:    · You have new or worse nausea and vomiting.     · You have new symptoms such as hearing loss or roaring in your ears. Watch closely for changes in your health, and be sure to contact your doctor if:    · You are not getting better as expected.     · Your vertigo gets worse. Where can you learn more? Go to http://www.gray.com/  Enter P372 in the search box to learn more about \"Benign Paroxysmal Positional Vertigo (BPPV): Care Instructions. \"  Current as of: April 15, 2020               Content Version: 12.6  © 4045-7966 [employer]. Care instructions adapted under license by [employer] (which disclaims liability or warranty for this information). If you have questions about a medical condition or this instruction, always ask your healthcare professional. [place] [address] any warranty or liability for your use of this information.

## 2021-03-09 RX ORDER — EZETIMIBE 10 MG/1
10 TABLET ORAL DAILY
Qty: 90 TAB | Refills: 0 | Status: SHIPPED | OUTPATIENT
Start: 2021-03-09 | End: 2021-04-19 | Stop reason: ALTCHOICE

## 2021-03-09 NOTE — TELEPHONE ENCOUNTER
Last Visit: 9/28/20 MD Leatha Roblero, labs done  Next Appointment: 3/24/21 MD Leatha Roblero  Previous Refill Encounter(s): 9/24/20 90 + 1    Requested Prescriptions     Pending Prescriptions Disp Refills    ezetimibe (ZETIA) 10 mg tablet 90 Tab 1     Sig: Take 1 Tab by mouth daily.  For cholesterol

## 2021-03-23 DIAGNOSIS — R51.9 SINUS HEADACHE: ICD-10-CM

## 2021-03-23 RX ORDER — MINERAL OIL
ENEMA (ML) RECTAL
Qty: 30 TAB | Refills: 3 | Status: SHIPPED | OUTPATIENT
Start: 2021-03-23 | End: 2021-04-19 | Stop reason: ALTCHOICE

## 2021-04-19 ENCOUNTER — OFFICE VISIT (OUTPATIENT)
Dept: FAMILY MEDICINE CLINIC | Age: 56
End: 2021-04-19
Payer: COMMERCIAL

## 2021-04-19 VITALS
OXYGEN SATURATION: 99 % | WEIGHT: 159.2 LBS | HEART RATE: 66 BPM | RESPIRATION RATE: 14 BRPM | BODY MASS INDEX: 23.58 KG/M2 | TEMPERATURE: 97.5 F | SYSTOLIC BLOOD PRESSURE: 119 MMHG | HEIGHT: 69 IN | DIASTOLIC BLOOD PRESSURE: 79 MMHG

## 2021-04-19 DIAGNOSIS — I25.5 ISCHEMIC CARDIOMYOPATHY: ICD-10-CM

## 2021-04-19 DIAGNOSIS — I21.02 ST ELEVATION MYOCARDIAL INFARCTION INVOLVING LEFT ANTERIOR DESCENDING (LAD) CORONARY ARTERY (HCC): ICD-10-CM

## 2021-04-19 DIAGNOSIS — Z09 HOSPITAL DISCHARGE FOLLOW-UP: Primary | ICD-10-CM

## 2021-04-19 DIAGNOSIS — E78.2 MIXED HYPERLIPIDEMIA: ICD-10-CM

## 2021-04-19 DIAGNOSIS — I95.89 HYPOTENSION DUE TO HYPOVOLEMIA: ICD-10-CM

## 2021-04-19 DIAGNOSIS — E86.1 HYPOTENSION DUE TO HYPOVOLEMIA: ICD-10-CM

## 2021-04-19 DIAGNOSIS — K64.5 EXTERNAL HEMORRHOID, THROMBOSED: ICD-10-CM

## 2021-04-19 PROCEDURE — 99215 OFFICE O/P EST HI 40 MIN: CPT | Performed by: FAMILY MEDICINE

## 2021-04-19 RX ORDER — TICAGRELOR 90 MG/1
TABLET ORAL
COMMUNITY
Start: 2021-03-27

## 2021-04-19 RX ORDER — NITROGLYCERIN 0.4 MG/1
TABLET SUBLINGUAL
COMMUNITY
Start: 2021-03-27 | End: 2022-02-28 | Stop reason: ALTCHOICE

## 2021-04-19 RX ORDER — MIDODRINE HYDROCHLORIDE 5 MG/1
TABLET ORAL
COMMUNITY
Start: 2021-03-27 | End: 2022-02-28 | Stop reason: ALTCHOICE

## 2021-04-19 RX ORDER — HYDROCORTISONE 25 MG/G
CREAM TOPICAL 4 TIMES DAILY
Qty: 30 G | Refills: 0 | Status: SHIPPED | OUTPATIENT
Start: 2021-04-19 | End: 2022-02-28 | Stop reason: ALTCHOICE

## 2021-04-19 RX ORDER — HYDROCORTISONE ACETATE 25 MG/1
25 SUPPOSITORY RECTAL EVERY 12 HOURS
COMMUNITY
End: 2022-02-28 | Stop reason: ALTCHOICE

## 2021-04-19 RX ORDER — ATORVASTATIN CALCIUM 40 MG/1
TABLET, FILM COATED ORAL
COMMUNITY
Start: 2021-03-27 | End: 2022-06-28 | Stop reason: DRUGHIGH

## 2021-04-19 RX ORDER — LANOLIN ALCOHOL/MO/W.PET/CERES
CREAM (GRAM) TOPICAL
COMMUNITY
Start: 2021-03-27 | End: 2021-04-19 | Stop reason: ALTCHOICE

## 2021-04-19 RX ORDER — DOCUSATE SODIUM 100 MG
CAPSULE ORAL
COMMUNITY
Start: 2021-03-27 | End: 2022-02-28 | Stop reason: ALTCHOICE

## 2021-04-19 RX ORDER — ASPIRIN 81 MG/1
TABLET ORAL
COMMUNITY
Start: 2021-03-27

## 2021-04-19 NOTE — ASSESSMENT & PLAN NOTE
New onset. Following Dr. Phyllis Fried from [place] [facility]. Records from hospital were reviewed. He is off beta-blocker and ARB due to persistent hypotension. He is on Brilinta and aspirin along with high-dose statin.

## 2021-04-19 NOTE — PATIENT INSTRUCTIONS
Hemorrhoids: Care Instructions Your Care Instructions Hemorrhoids are enlarged veins that develop in the anal canal. Bleeding during bowel movements, itching, swelling, and rectal pain are the most common symptoms. They can be uncomfortable at times, but hemorrhoids rarely are a serious problem. You can treat most hemorrhoids with simple changes to your diet and bowel habits. These changes include eating more fiber and not straining to pass stools. Most hemorrhoids do not need surgery or other treatment unless they are very large and painful or bleed a lot. Follow-up care is a key part of your treatment and safety. Be sure to make and go to all appointments, and call your doctor if you are having problems. It's also a good idea to know your test results and keep a list of the medicines you take. How can you care for yourself at home? · Sit in a few inches of warm water (sitz bath) 3 times a day and after bowel movements. The warm water helps with pain and itching. · Put ice on your anal area several times a day for 10 minutes at a time. Put a thin cloth between the ice and your skin. Follow this by placing a warm, wet towel on the area for another 10 to 20 minutes. · Take pain medicines exactly as directed. ? If the doctor gave you a prescription medicine for pain, take it as prescribed. ? If you are not taking a prescription pain medicine, ask your doctor if you can take an over-the-counter medicine. · Keep the anal area clean, but be gentle. Use water and a fragrance-free soap, such as [place], or use baby wipes or medicated pads, such as Tucks. · Wear cotton underwear and loose clothing to decrease moisture in the anal area. · Eat more fiber. Include foods such as whole-grain breads and cereals, raw vegetables, raw and dried fruits, and beans. · Drink plenty of fluids.  If you have kidney, heart, or liver disease and have to limit fluids, talk with your doctor before you increase the amount of fluids you drink. · Use a stool softener that contains bran or psyllium. You can save money by buying bran or psyllium (available in bulk at most health food stores) and sprinkling it on foods or stirring it into fruit juice. Or you can use a product such as Metamucil or Hydrocil. · Practice healthy bowel habits. ? Go to the bathroom as soon as you have the urge. ? Avoid straining to pass stools. Relax and give yourself time to let things happen naturally. ? Do not hold your breath while passing stools. ? Do not read while sitting on the toilet. Get off the toilet as soon as you have finished. · Take your medicines exactly as prescribed. Call your doctor if you think you are having a problem with your medicine. When should you call for help? Call 911 anytime you think you may need emergency care. For example, call if: 
  · You pass maroon or very bloody stools. Call your doctor now or seek immediate medical care if: 
  · You have increased pain.  
  · You have increased bleeding. Watch closely for changes in your health, and be sure to contact your doctor if: 
  · Your symptoms have not improved after 3 or 4 days. Where can you learn more? Go to http://www.TagArray.com/ Enter F228 in the search box to learn more about \"Hemorrhoids: Care Instructions. \" Current as of: April 15, 2020               Content Version: 12.8 © 7519-8742 [employer]. Care instructions adapted under license by [employer] (which disclaims liability or warranty for this information). If you have questions about a medical condition or this instruction, always ask your healthcare professional. [place] [address] any warranty or liability for your use of this information.

## 2021-04-19 NOTE — PROGRESS NOTES
HISTORY OF PRESENT ILLNESS  Ryan Chakraborty is a 54 y.o. male. Seen today for follow-up on recent hospital admission for acute MI, cardiogenic shock and ischemic cardiomyopathy. He also had significant anemia. Today he is also complaining about painful defecation. HPI  Hospital Follow Up  Ryan Chakraborty is seen for follow up from recent admission to [address] [place] [address] Dr Conklin on 03/20/21. he was discharged on 03/27/21  We reviewed the active problem list, medication list, allergies, notes from last encounter, lab results, hospital discharge summary. He presented with acute onset of chest pain with profound sweating. As per him, he went to move his bowel and had weird chest pain along with sweating on his face. His pain persisted and he drove to emergency department and did not wait for EMS to arrive. In ER visit he was diagnosed with acute anterior ST elevation infarction. He was taken emergently to Cath Lab and was found to have 90% proximal, 95% mid LAD. In addition he had a 70% distal left posterolateral branch vessel. Circumflex was dominant. Right coronary artery was not able to be visualized. Patient underwent PCI for the proximal LAD with placement of 3.0 x 18 mm resolute stent. In the mid LAD had placement of 2 resolute stents, 3.0 x 18 and more distally and overlapping 3.0 x 15 mm. All 3 of the stents did overlap. Left ventriculography showed an ejection fraction of 35% with anteroapical akinesis. Patient initially had systolic blood pressure staying below 100 but during the procedure he developed cardiogenic shock and Impella support catheter was placed as well as Hanna-See catheter. Patient was stable overnight but then had some difficulty with positioning of Impella catheter with some bleeding around the site. The Impella was removed the following day by surgery department. Patient had persistent mild hypotension as well as anemia with hemoglobin dropped to 8.3.   He was started on Levophed and hospital was unable to wean Levophed despite multiple fluid boluses. Follow-up echocardiogram showed ejection fraction of 45%. Patient had abdominal and pelvic CT scan to rule out any retroperitoneal bleed and it was negative for any acute finding. Ultimately please send was started on low-dose of midodrine and Levophed was weaned off. Patient was started on iron and was off all his blood pressure medicine. Patient was discharged on midodrine but still of beta-blocker and ARB. He was discharged with Brilinta 90 mg along with aspirin 81 mg, atorvastatin 40 mg, midodrine 5 mg 2 times and as needed nitroglycerin. Patient had follow-up blood work and appointment with cardiology on 8 April. He had follow-up blood work done, but no reports are available for review. As per patient, he had severe constipation. He has history of constipation and internal hemorrhoid. After his hospitalization had significant pain with bowel movement. Last week he did call our office and I started him back on hydrocortisone suppositories. Today as per heme, there is no improvement in constipation or pain and actually he feels painful lump outside his anal margin. He is taking psyllium husk along with stool softener capsules. He is taking his medications including blood thinner as directed & without any side effects. He reports symptoms are improved. Review of Systems   Constitutional: Negative for chills, fever and malaise/fatigue. HENT: Negative for congestion, ear pain, sore throat and tinnitus. Eyes: Negative for blurred vision, double vision, pain and discharge. Respiratory: Negative for cough, shortness of breath and wheezing. Cardiovascular: Negative for chest pain, palpitations and leg swelling. Gastrointestinal: Positive for constipation. Negative for abdominal pain, blood in stool, diarrhea, nausea and vomiting. Painful lesion at anal margin  Pain notification. Genitourinary: Negative for dysuria, frequency, hematuria and urgency. Musculoskeletal: Negative for back pain, joint pain and myalgias. Skin: Negative for rash. Neurological: Negative for dizziness, tremors, seizures and headaches. Endo/Heme/Allergies: Negative for polydipsia. Does not bruise/bleed easily. Psychiatric/Behavioral: Negative for depression and substance abuse. The patient is not nervous/anxious. Physical Exam  Vitals signs and nursing note reviewed. Constitutional:       Appearance: He is well-developed. He is not diaphoretic. HENT:      Head: Normocephalic and atraumatic. Right Ear: External ear normal.      Mouth/Throat:      Pharynx: No oropharyngeal exudate. Eyes:      General: No scleral icterus. Conjunctiva/sclera: Conjunctivae normal.      Pupils: Pupils are equal, round, and reactive to light. Neck:      Musculoskeletal: Normal range of motion and neck supple. Thyroid: No thyromegaly. Vascular: No JVD. Cardiovascular:      Rate and Rhythm: Normal rate and regular rhythm. Heart sounds: Normal heart sounds. No murmur. Pulmonary:      Effort: Pulmonary effort is normal.      Breath sounds: Normal breath sounds. No wheezing. Abdominal:      General: Bowel sounds are normal. There is no distension. Palpations: Abdomen is soft. There is no mass. Genitourinary:     Rectum: Tenderness, anal fissure and external hemorrhoid present. Comments: Anal sphincter significantly tight with presence of acute posterior midline anal fissure and tender external hemorrhoid  Musculoskeletal: Normal range of motion. General: No tenderness. Lymphadenopathy:      Cervical: No cervical adenopathy. Skin:     General: Skin is warm and dry. Findings: No rash. Neurological:      Mental Status: He is alert and oriented to person, place, and time. Cranial Nerves: No cranial nerve deficit.       Deep Tendon Reflexes: Reflexes are normal and symmetric. Reflexes normal.         ASSESSMENT and PLAN  Diagnoses and all orders for this visit:    1. Hospital discharge follow-up    2. ST elevation myocardial infarction involving left anterior descending (LAD) coronary artery [facility])  Assessment & Plan:  New onset. Following Dr. Noel Estrada from [place] [facility]. Records from hospital were reviewed. He is off beta-blocker and ARB due to persistent hypotension. He is on Brilinta and aspirin along with high-dose statin. 3. Ischemic cardiomyopathy    4. Mixed hyperlipidemia    5. Hypotension due to hypovolemia    6. External hemorrhoid, thrombosed  -     hydrocortisone (ANUSOL-HC) 2.5 % rectal cream; Insert  into rectum four (4) times daily. I spent 40 to 45 minutes in reviewing all records from hospital along with discussing his hospital course, lifestyle modification, diet changes, LDL and blood pressure goal for him. Recommended high-fiber diet along with increasing dose of psyllium husk. Recommended hot sitz bath along with topical hydrocortisone. Discussed lifestyle issues and health guidance given  Patient was given an after visit summary which includes diagnoses, vital signs, current medications, instructions and references & authorized prescriptions . Results of labs will be conveyed to patient, once available. Pt verbalized instructions I provided and expressed understanding of discussion that was held today.

## 2021-04-19 NOTE — PROGRESS NOTES
Chief Complaint   Patient presents with    Transitions Of Care     heart attack, follow up       1. Have you been to the ER, urgent care clinic since your last visit? Hospitalized since your last visit? Yes When: 3/20 Where: [address] [place] [address] Reason for visit: Heart attack    2. Have you seen or consulted any other health care providers outside of the [address] [place] [address] since your last visit? Include any pap smears or colon screening.  No      3 most recent PHQ Screens 4/19/2021   Little interest or pleasure in doing things Not at all   Feeling down, depressed, irritable, or hopeless Not at all   Total Score PHQ 2 0       Health Maintenance Due   Topic Date Due    Hepatitis C Screening  Never done    DTaP/Tdap/Td series (1 - Tdap) Never done    Shingrix Vaccine Age 50> (1 of 2) Never done

## 2021-04-22 RX ORDER — POLYETHYLENE GLYCOL 3350 17 G/17G
17 POWDER, FOR SOLUTION ORAL 2 TIMES DAILY
Qty: 510 G | Refills: 0 | Status: SHIPPED | OUTPATIENT
Start: 2021-04-22 | End: 2021-06-10

## 2021-05-12 ENCOUNTER — OFFICE VISIT (OUTPATIENT)
Dept: FAMILY MEDICINE CLINIC | Age: 56
End: 2021-05-12
Payer: COMMERCIAL

## 2021-05-12 VITALS
HEART RATE: 66 BPM | RESPIRATION RATE: 16 BRPM | SYSTOLIC BLOOD PRESSURE: 120 MMHG | HEIGHT: 69 IN | OXYGEN SATURATION: 99 % | BODY MASS INDEX: 23.55 KG/M2 | WEIGHT: 159 LBS | TEMPERATURE: 97.5 F | DIASTOLIC BLOOD PRESSURE: 72 MMHG

## 2021-05-12 DIAGNOSIS — Z09 HOSPITAL DISCHARGE FOLLOW-UP: ICD-10-CM

## 2021-05-12 DIAGNOSIS — R91.1 PULMONARY NODULE, RIGHT: Primary | ICD-10-CM

## 2021-05-12 PROCEDURE — 99213 OFFICE O/P EST LOW 20 MIN: CPT | Performed by: FAMILY MEDICINE

## 2021-05-12 NOTE — PROGRESS NOTES
Chief Complaint   Patient presents with    Medication Management     pt took a laxative and soon after 10 minutes pt had abdominal cramping was rushed to ER    Other     Pt recieved a letter from the hospital staing he has a lung nodule       1. Have you been to the ER, urgent care clinic since your last visit? Hospitalized since your last visit? Yes When: 4/22/21 Where: [facility]  Reason for visit: abdominal cramping    2. Have you seen or consulted any other health care providers outside of the [address] [place] [address] since your last visit? Include any pap smears or colon screening.  No        3 most recent PHQ Screens 5/12/2021   Little interest or pleasure in doing things Not at all   Feeling down, depressed, irritable, or hopeless Not at all   Total Score PHQ 2 0       Health Maintenance Due   Topic Date Due    Hepatitis C Screening  Never done    DTaP/Tdap/Td series (1 - Tdap) Never done    Shingrix Vaccine Age 50> (1 of 2) Never done

## 2021-05-12 NOTE — PROGRESS NOTES
HISTORY OF PRESENT ILLNESS  Gisele Jones is a 64 y.o. male. He was seen today to discuss his recent ER visit for abdominal pain, nausea and vomiting. Chantel Billings is seen for follow up from recent ED visit to [address] [place] [address] Dr Conklin on 04/22/21 We reviewed the active problem list, medication list, allergies, notes from last encounter, lab results, imaging, ER provider summary. He presented with abdominal pain, cramping and vomiting. Actually he took laxative on empty stomach and started with severe vomiting after that. In ER all his work-up was very reassuring including CBC, CMP, lipase, troponin x2 and urine culture. He had a CT scan of abdomen that showed thickening of the wall of distal stomach and duodenum likely from gastritis versus empty stomach. CT scan also showed incidental finding of 5 mm pulmonary nodule on right lower lobe. He is taking his all other medications as directed & without any side effects. He reports symptoms are resolved. He wanted to discuss follow-up on pulmonary nodule. Review of Systems   Constitutional: Negative for chills, fever and malaise/fatigue. HENT: Negative for congestion, ear pain, sore throat and tinnitus. Eyes: Negative for blurred vision, double vision, pain and discharge. Respiratory: Negative for cough, shortness of breath and wheezing. Cardiovascular: Negative for chest pain, palpitations and leg swelling. Gastrointestinal: Negative for abdominal pain, blood in stool, constipation, diarrhea, nausea and vomiting. Genitourinary: Negative for dysuria, frequency, hematuria and urgency. Musculoskeletal: Negative for back pain, joint pain and myalgias. Skin: Negative for rash. Neurological: Negative for dizziness, tremors, seizures and headaches. Endo/Heme/Allergies: Negative for polydipsia. Does not bruise/bleed easily. Psychiatric/Behavioral: Negative for depression and substance abuse.  The patient is not nervous/anxious. Physical Exam  Vitals signs and nursing note reviewed. Constitutional:       Appearance: He is well-developed. He is not diaphoretic. HENT:      Head: Normocephalic and atraumatic. Right Ear: External ear normal.      Mouth/Throat:      Pharynx: No oropharyngeal exudate. Eyes:      General: No scleral icterus. Conjunctiva/sclera: Conjunctivae normal.      Pupils: Pupils are equal, round, and reactive to light. Neck:      Musculoskeletal: Normal range of motion and neck supple. Thyroid: No thyromegaly. Vascular: No JVD. Cardiovascular:      Rate and Rhythm: Normal rate and regular rhythm. Heart sounds: Normal heart sounds. No murmur. Pulmonary:      Effort: Pulmonary effort is normal.      Breath sounds: Normal breath sounds. No wheezing. Abdominal:      General: Bowel sounds are normal. There is no distension. Palpations: Abdomen is soft. There is no mass. Musculoskeletal: Normal range of motion. General: No tenderness. Lymphadenopathy:      Cervical: No cervical adenopathy. Skin:     General: Skin is warm and dry. Findings: No rash. Neurological:      Mental Status: He is alert and oriented to person, place, and time. Cranial Nerves: No cranial nerve deficit. Deep Tendon Reflexes: Reflexes are normal and symmetric. Reflexes normal.         ASSESSMENT and PLAN  Diagnoses and all orders for this visit:    1. Pulmonary nodule, right    2. Hospital discharge follow-up    Long discussion on review of his work-up from emergency department and also from his cardiology office. Discussed guidelines for follow-up on incidental finding of pulmonary nodule. As his nodule is 5 mm size and he does not have any significant risk factor for malignancy, recommended not any follow-up for now. Just to be on the safer side we will repeat CT chest in a year. We spent 20 minutes on this discussion.   Discussed lifestyle issues and health guidance given  Patient was given an after visit summary which includes diagnoses, vital signs, current medications, instructions and references & authorized prescriptions . Results of labs will be conveyed to patient, once available. Pt verbalized instructions I provided and expressed understanding of discussion that was held today.

## 2021-05-12 NOTE — PATIENT INSTRUCTIONS
Learning About Lung Nodules What is a lung nodule? A lung nodule is a growth in the lung. A single nodule surrounded by lung tissue is called a solitary pulmonary nodule. A lung nodule might not cause any symptoms. Your doctor may have found one or more nodules on your lung when you were having a chest X-ray or CT scan. Or it may have been found during a lung cancer screening. A lung nodule may be caused by an old infection or cancer. It might also be a noncancerous growth. Lung nodules can cause a screening to give an abnormal result. Most nodules do not cause any harm. But without further tests, your doctor can't tell whether an abnormal finding is cancer, a harmless nodule, or something else. What can you expect when you have a lung nodule? Your doctor will look at several risk factors to see how likely it is that the nodule is cancer. He or she will look at: · Whether you smoke or have ever smoked. · Your age and your family's medical history. · Whether you have ever had lung cancer. · The size, density, and other characteristics of the nodule. · Whether the nodule has changed in size. Your doctor may look at past chest X-rays or CT scans, if available, and compare them. Or you may have a series of CT scans to see if the nodule grows over time. What happens next depends on the risk of the nodule being cancer. · If you have no risk factors and the nodule is small, your doctor may advise doing nothing. · If the risk is small, your doctor may schedule follow-up appointments and CT scans later to see if the nodule is growing. · If there's a higher risk of cancer, your doctor may: ? Do a PET scan, which may help tell if the nodule is cancerous or not. ? Take a sample of tissue from the nodule for testing. This is called a biopsy. ? Remove the nodule with surgery. Follow-up care is a key part of your treatment and safety.  Be sure to make and go to all appointments, and call your doctor if you are having problems. It's also a good idea to know your test results and keep a list of the medicines you take. Where can you learn more? Go to http://www.gray.com/ Enter E718 in the search box to learn more about \"Learning About Lung Nodules. \" Current as of: December 17, 2020               Content Version: 12.8 © 0879-4933 [employer]. Care instructions adapted under license by [employer] (which disclaims liability or warranty for this information). If you have questions about a medical condition or this instruction, always ask your healthcare professional. [place] [address] any warranty or liability for your use of this information.

## 2021-06-10 RX ORDER — NICOTINE POLACRILEX 2 MG
LOZENGE BUCCAL
Qty: 510 G | Refills: 0 | Status: SHIPPED | OUTPATIENT
Start: 2021-06-10 | End: 2022-02-28 | Stop reason: ALTCHOICE

## 2021-07-27 ENCOUNTER — TELEPHONE (OUTPATIENT)
Dept: FAMILY MEDICINE CLINIC | Age: 56
End: 2021-07-27

## 2021-07-27 NOTE — TELEPHONE ENCOUNTER
As per the chart review and hospital discharge summary, after his hospital admission for massive MI, he was all all blood pressure medicines due to his persistent low blood pressures. When he was discharged from the hospital, he was not on any ARB or beta-blocker. Please let patient know that if his blood pressure is staying high, we need to slowly resume back on medication but he will need an office visit. It can be either his cardiology visit or I can see him in office and slowly we can resume back on his previous blood pressure medicines.   Thanks

## 2021-07-27 NOTE — TELEPHONE ENCOUNTER
----- Message from Lucita Henriquez sent at 7/26/2021  1:36 PM EDT -----  Regarding: Dr. Rica Harrison Message/Vendor Calls    Caller's first and last name: n/a      Reason for call: discuss blood pressure, 138/95 is getting high; head is slightly heavy.  requesting to know if he should to \"valsartan 40mg\" or should he stop taking \"midodrine 5mg\"      Callback required yes/no and why: yes      Best contact number(s): [phone]      Details to clarify the request: n/a      Lucita Henriquez

## 2021-07-27 NOTE — TELEPHONE ENCOUNTER
Outbound call to patient. Name and  verified. Patient stated that he has been talking valsartan and midodrine earlier that after he had a recent heart attack. he was instructed to stop taking the medications because of low blood pressure. Patient now is not taking any blood pressure medication and his blood pressure is high. Informed patient that we don't valsartan as part of his medication list in our system. Advised to follow up with cardiology and schedule an appointment with Suha Evans to check BP and evaluate medications. He stated understanding and stated that he will go to cardiology and will call to schedule an appointment with Suha Evans. Patient was thankful to my call but was upset that we returned call after one day. Pt stated he called  Yesterday. Informed patient that we are under staffed and we apologize and I just received the call today.

## 2022-02-23 ENCOUNTER — TELEPHONE (OUTPATIENT)
Dept: FAMILY MEDICINE CLINIC | Age: 57
End: 2022-02-23

## 2022-02-23 NOTE — TELEPHONE ENCOUNTER
Patient called stated he is having leg pain and would like to see provider. Offer appointment he refused. Want the nurse to give him a call as soon as possible.     Requesting a call back    Best call back #[phone]

## 2022-02-23 NOTE — TELEPHONE ENCOUNTER
Outbound call to patient. Name and  verified. Pt stated that he has leg pain for last one week, on/off but was consistent last two days. Sometimes it is tolerable but it gets worse at night. Pt used a heating pad and took tylenol PM yesterday night. It was better but still has little bit of pain.   Scheduled pt an appt for 22 at 3:00    FYI  Thanks

## 2022-02-23 NOTE — TELEPHONE ENCOUNTER
Thanks. He did not return back for his 6 months follow-up appointment after his last visit in last May.

## 2022-02-28 ENCOUNTER — OFFICE VISIT (OUTPATIENT)
Dept: FAMILY MEDICINE CLINIC | Age: 57
End: 2022-02-28
Payer: COMMERCIAL

## 2022-02-28 VITALS
WEIGHT: 165.2 LBS | BODY MASS INDEX: 24.47 KG/M2 | OXYGEN SATURATION: 98 % | RESPIRATION RATE: 18 BRPM | HEIGHT: 69 IN | HEART RATE: 72 BPM | DIASTOLIC BLOOD PRESSURE: 70 MMHG | SYSTOLIC BLOOD PRESSURE: 115 MMHG | TEMPERATURE: 97.9 F

## 2022-02-28 DIAGNOSIS — R51.9 SINUS HEADACHE: ICD-10-CM

## 2022-02-28 DIAGNOSIS — I25.5 ISCHEMIC CARDIOMYOPATHY: ICD-10-CM

## 2022-02-28 DIAGNOSIS — E78.2 MIXED HYPERLIPIDEMIA: ICD-10-CM

## 2022-02-28 DIAGNOSIS — R91.1 INCIDENTAL PULMONARY NODULE, > 3MM AND < 8MM: ICD-10-CM

## 2022-02-28 DIAGNOSIS — I10 ESSENTIAL HYPERTENSION: ICD-10-CM

## 2022-02-28 DIAGNOSIS — I25.2 HISTORY OF ST ELEVATION MYOCARDIAL INFARCTION (STEMI): Primary | ICD-10-CM

## 2022-02-28 DIAGNOSIS — Z91.09 ENVIRONMENTAL ALLERGIES: ICD-10-CM

## 2022-02-28 PROCEDURE — 99214 OFFICE O/P EST MOD 30 MIN: CPT | Performed by: FAMILY MEDICINE

## 2022-02-28 RX ORDER — AZELASTINE 1 MG/ML
2 SPRAY, METERED NASAL
Qty: 1 EACH | Refills: 1 | Status: SHIPPED | OUTPATIENT
Start: 2022-02-28 | End: 2022-06-01

## 2022-02-28 RX ORDER — OMEPRAZOLE 40 MG/1
CAPSULE, DELAYED RELEASE ORAL
COMMUNITY
Start: 2022-01-13 | End: 2022-03-31 | Stop reason: ALTCHOICE

## 2022-02-28 RX ORDER — VALSARTAN 40 MG/1
TABLET ORAL DAILY
COMMUNITY

## 2022-02-28 RX ORDER — CARVEDILOL 3.12 MG/1
3.12 TABLET ORAL 2 TIMES DAILY WITH MEALS
COMMUNITY
Start: 2022-01-27

## 2022-02-28 RX ORDER — MINERAL OIL
180 ENEMA (ML) RECTAL DAILY
Qty: 30 TABLET | Refills: 2 | Status: SHIPPED | OUTPATIENT
Start: 2022-02-28 | End: 2022-06-28 | Stop reason: ALTCHOICE

## 2022-02-28 NOTE — PATIENT INSTRUCTIONS
Allergies: Care Instructions  Overview     Allergies occur when your body's defense system (immune system) overreacts to certain substances. The immune system treats a harmless substance as if it were a harmful germ or virus. Many things can make this happen. These include pollens, medicine, food, dust, animal dander, and mold. Allergies can be mild or severe. Mild allergies can be managed with home treatment. But medicine may be needed to prevent problems. Managing your allergies is an important part of staying healthy. Your doctor may suggest that you have allergy testing to help find out what is causing your allergies. Severe allergies can cause reactions that affect your whole body (anaphylactic reactions). Your doctor may prescribe a shot of epinephrine to carry with you in case you have a severe reaction. Learn how to give yourself the shot and keep it with you at all times. Make sure it is not . Follow-up care is a key part of your treatment and safety. Be sure to make and go to all appointments, and call your doctor if you are having problems. It's also a good idea to know your test results and keep a list of the medicines you take. How can you care for yourself at home? · If you have been told by your doctor that dust or dust mites are causing your allergy, decrease the dust around your bed:  ? Wash sheets, pillowcases, and other bedding in hot water every week. ? Use dust-proof covers for pillows, duvets, and mattresses. Avoid plastic covers because they tear easily and do not \"breathe. \" Wash as instructed on the label. ? Do not use any blankets and pillows that you do not need. ? Use blankets that you can wash in your washing machine. ? Consider removing drapes and carpets, which attract and hold dust, from your bedroom. · If you are allergic to house dust and mites, do not use home humidifiers. Your doctor can suggest ways you can control dust and mites.   · Look for signs of cockroaches. Cockroaches cause allergic reactions. Use cockroach baits to get rid of them. Then, clean your home well. Cockroaches like areas where grocery bags, newspapers, empty bottles, or cardboard boxes are stored. Do not keep these inside your home, and keep trash and food containers sealed. Seal off any spots where cockroaches might enter your home. · If you are allergic to mold, get rid of furniture, rugs, and drapes that smell musty. Check for mold in the bathroom. · If you are allergic to outdoor pollen or mold spores, use air-conditioning. Change or clean all filters every month. Keep windows closed. · If you are allergic to pollen, stay inside when pollen counts are high. Use a vacuum  with a HEPA filter or a double-thickness filter at least two times each week. · Stay inside when air pollution is bad. Avoid paint fumes, perfumes, and other strong odors. · Avoid conditions that make your allergies worse. Stay away from smoke. Do not smoke or let anyone else smoke in your house. Do not use fireplaces or wood-burning stoves. · If you are allergic to your pets, change the air filter in your furnace every month. Use high-efficiency filters. · If you are allergic to pet dander, keep pets outside or out of your bedroom. Old carpet and cloth furniture can hold a lot of animal dander. You may need to replace them. When should you call for help? Give an epinephrine shot if:    · You think you are having a severe allergic reaction.     · You have symptoms in more than one body area, such as mild nausea and an itchy mouth. After giving an epinephrine shot call 911, even if you feel better. Call 911 if:    · You have symptoms of a severe allergic reaction. These may include:  ? Sudden raised, red areas (hives) all over your body. ? Swelling of the throat, mouth, lips, or tongue. ? Trouble breathing. ? Passing out (losing consciousness).  Or you may feel very lightheaded or suddenly feel weak, confused, or restless.     · You have been given an epinephrine shot, even if you feel better. Call your doctor now or seek immediate medical care if:    · You have symptoms of an allergic reaction, such as:  ? A rash or hives (raised, red areas on the skin). ? Itching. ? Swelling. ? Belly pain, nausea, or vomiting. Watch closely for changes in your health, and be sure to contact your doctor if:    · You do not get better as expected. Where can you learn more? Go to http://www.lin.com/  Enter W171 in the search box to learn more about \"Allergies: Care Instructions. \"  Current as of: February 10, 2021               Content Version: 13.0  © 2006-2021 [employer]. Care instructions adapted under license by [employer] (which disclaims liability or warranty for this information). If you have questions about a medical condition or this instruction, always ask your healthcare professional. [place] [address] any warranty or liability for your use of this information.

## 2022-02-28 NOTE — PROGRESS NOTES
HISTORY OF PRESENT ILLNESS  Jolie Montoya is a 64 y.o. male. Patient scheduling appointment for left-sided leg pain but he informed me that his pain has resolved with heating pad. Is concerned about persistent headaches, allergy, sinus congestion. He has history of STEMI and follows [facility]. Patient had incidental finding of pulmonary nodule of 5 mm in size on base of right lower lobe. CT scan was done during his hospitalization in April 2021. Neurological Review  He is here today to talk about headaches.  This is a chronic problem.  Episodes are occuring several times per month and duration of individual headaches are gradual and a few hours   The pain is described as dull pain, bilateral in the frontal area, bilateral in the temporal area.  Associated symptoms: congestion, facial pain and decreased vision and eye strain. Denies fever, flashing lights, fluid retention, light sensitivity, loss of vision, motor impairment, nausea and vomiting Precipitating factors: patient is aware of none.  He denies a history of recent head injury.  Treatments for headaches currently include: antihistamine and Meloxicam.    He has headache during night also and also in morning when he wakes up in morning.     Cardiovascular Review  The patient has hypertension, hyperlipidemia, coronary artery disease and history of prior MI, s/p stent. He reports taking medications as instructed, no medication side effects noted, no chest pain on exertion, no dyspnea on exertion, no swelling of ankles, no orthostatic dizziness or lightheadedness, no orthopnea or paroxysmal nocturnal dyspnea, no palpitations. Diet and Lifestyle: generally follows a low fat low cholesterol diet, generally follows a low sodium diet, exercises regularly, nonsmoker. Lab review: no lab studies available for review at time of visit, patient had blood work with cardiology office, records requested.     Medicines: Carvedilol 3.125 mg twice daily, atorvastatin 40 mg, Brilinta 90 mg, aspirin 81 mg. He is taking his valsartan only as needed      Review of Systems   Constitutional: Negative for chills, fever and malaise/fatigue. HENT: Positive for congestion and sinus pain. Negative for ear pain, sore throat and tinnitus. Allergies   Eyes: Negative for blurred vision, double vision, pain and discharge. Respiratory: Negative for cough, shortness of breath and wheezing. Cardiovascular: Negative for chest pain, palpitations and leg swelling. Gastrointestinal: Negative for abdominal pain, blood in stool, constipation, diarrhea, nausea and vomiting. Genitourinary: Negative for dysuria, frequency, hematuria and urgency. Musculoskeletal: Negative for back pain, joint pain and myalgias. Skin: Negative for rash. Neurological: Positive for headaches. Negative for dizziness, tremors and seizures. Endo/Heme/Allergies: Negative for polydipsia. Does not bruise/bleed easily. Psychiatric/Behavioral: Negative for depression and substance abuse. The patient is not nervous/anxious. Physical Exam  Vitals and nursing note reviewed. Constitutional:       General: He is not in acute distress. Appearance: He is well-developed. He is not diaphoretic. HENT:      Head: Normocephalic and atraumatic. Right Ear: Tympanic membrane and external ear normal. No decreased hearing noted. No drainage. No middle ear effusion. Tympanic membrane is not injected. Left Ear: Tympanic membrane normal. No decreased hearing noted. No drainage. No middle ear effusion. Tympanic membrane is not injected. Nose: Mucosal edema present. No rhinorrhea. Right Sinus: Maxillary sinus tenderness and frontal sinus tenderness present. Left Sinus: Maxillary sinus tenderness and frontal sinus tenderness present. Mouth/Throat:      Pharynx: Uvula midline. Posterior oropharyngeal erythema present. No oropharyngeal exudate.    Eyes: General: No scleral icterus. Conjunctiva/sclera: Conjunctivae normal.      Pupils: Pupils are equal, round, and reactive to light. Neck:      Thyroid: No thyromegaly. Vascular: No JVD. Cardiovascular:      Rate and Rhythm: Normal rate and regular rhythm. Heart sounds: Normal heart sounds. No murmur heard. Pulmonary:      Effort: Pulmonary effort is normal.      Breath sounds: Normal breath sounds. No wheezing or rales. Abdominal:      General: Bowel sounds are normal. There is no distension. Palpations: Abdomen is soft. There is no mass. Musculoskeletal:         General: No tenderness. Normal range of motion. Cervical back: Normal range of motion and neck supple. Lymphadenopathy:      Head:      Right side of head: No tonsillar adenopathy. Left side of head: No tonsillar adenopathy. Cervical: No cervical adenopathy. Skin:     General: Skin is warm and dry. Findings: No rash. Neurological:      Mental Status: He is alert and oriented to person, place, and time. Cranial Nerves: No cranial nerve deficit. Deep Tendon Reflexes: Reflexes are normal and symmetric. Reflexes normal.         ASSESSMENT and PLAN  Diagnoses and all orders for this visit:    1. History of ST elevation myocardial infarction (STEMI)    2. Essential hypertension    3. Mixed hyperlipidemia    4. Ischemic cardiomyopathy    5. Sinus headache  -     azelastine (ASTELIN) 137 mcg (0.1 %) nasal spray; 2 Sprays by Both Nostrils route nightly. Use in each nostril as directed  -     fexofenadine (ALLEGRA) 180 mg tablet; Take 1 Tablet by mouth daily. 6. Environmental allergies  -     azelastine (ASTELIN) 137 mcg (0.1 %) nasal spray; 2 Sprays by Both Nostrils route nightly. Use in each nostril as directed  -     fexofenadine (ALLEGRA) 180 mg tablet; Take 1 Tablet by mouth daily.     7. Incidental pulmonary nodule, > 3mm and < 8mm  -     CT CHEST WO CONT; Future      We will do follow-up CT scan on incidental finding of pulmonary nodule 1 time and if no change in normal no further follow-up needed. .  Added nasal spray for his chronic allergies along with Allegra. Discussed lifestyle issues and health guidance given  Patient was given an after visit summary which includes diagnoses, vital signs, current medications, instructions and references & authorized prescriptions . Results of labs will be conveyed to patient, once available. Pt verbalized instructions I provided and expressed understanding of discussion that was held today. Follow-up and Dispositions    · Return if symptoms worsen or fail to improve. Please note that this dictation was completed with [employer], the "[employer] voice recognition software. Quite often unanticipated grammatical, syntax, homophones, and other interpretive errors are inadvertently transcribed by the computer software. Please disregard these errors. Please excuse any errors that have escaped final proofreading. Thank you.

## 2022-02-28 NOTE — PROGRESS NOTES
Chief Complaint   Patient presents with    Leg Pain     left side x 1 week worse at night relieved by heating pad         1. \"Have you been to the ER, urgent care clinic since your last visit? Hospitalized since your last visit? \" No    2. \"Have you seen or consulted any other health care providers outside of the [address] [place] [address] since your last visit? \" No     3. For patients aged 39-70: Has the patient had a colonoscopy / FIT/ Cologuard? No      If the patient is female:    4. For patients aged 41-77: Has the patient had a mammogram within the past 2 years? NA - based on age or sex      11. For patients aged 21-65: Has the patient had a pap smear?  NA - based on age or sex         3 most recent PHQ Screens 2/28/2022   Little interest or pleasure in doing things Not at all   Feeling down, depressed, irritable, or hopeless Not at all   Total Score PHQ 2 0       Health Maintenance Due   Topic Date Due    Hepatitis C Screening  Never done    DTaP/Tdap/Td series (1 - Tdap) Never done    Shingrix Vaccine Age 50> (1 of 2) Never done    Flu Vaccine (1) 09/01/2021    COVID-19 Vaccine (3 - Booster for Pfizer series) 09/07/2021    Lipid Screen  09/24/2021

## 2022-03-19 PROBLEM — I10 ESSENTIAL HYPERTENSION: Status: ACTIVE | Noted: 2019-01-22

## 2022-03-19 PROBLEM — I25.2 HISTORY OF ST ELEVATION MYOCARDIAL INFARCTION (STEMI): Status: ACTIVE | Noted: 2021-04-19

## 2022-03-19 PROBLEM — E78.2 MIXED HYPERLIPIDEMIA: Status: ACTIVE | Noted: 2019-01-22

## 2022-03-31 ENCOUNTER — OFFICE VISIT (OUTPATIENT)
Dept: FAMILY MEDICINE CLINIC | Age: 57
End: 2022-03-31
Payer: COMMERCIAL

## 2022-03-31 VITALS
RESPIRATION RATE: 18 BRPM | HEART RATE: 68 BPM | DIASTOLIC BLOOD PRESSURE: 79 MMHG | OXYGEN SATURATION: 98 % | TEMPERATURE: 97.8 F | WEIGHT: 163 LBS | HEIGHT: 69 IN | BODY MASS INDEX: 24.14 KG/M2 | SYSTOLIC BLOOD PRESSURE: 130 MMHG

## 2022-03-31 DIAGNOSIS — Z91.09 ENVIRONMENTAL ALLERGIES: ICD-10-CM

## 2022-03-31 DIAGNOSIS — I10 ESSENTIAL HYPERTENSION: Primary | ICD-10-CM

## 2022-03-31 DIAGNOSIS — L21.9 SEBORRHEIC DERMATITIS OF SCALP: ICD-10-CM

## 2022-03-31 DIAGNOSIS — Z13.21 SCREENING FOR ENDOCRINE, NUTRITIONAL, METABOLIC AND IMMUNITY DISORDER: ICD-10-CM

## 2022-03-31 DIAGNOSIS — Z13.29 SCREENING FOR ENDOCRINE, NUTRITIONAL, METABOLIC AND IMMUNITY DISORDER: ICD-10-CM

## 2022-03-31 DIAGNOSIS — Z13.228 SCREENING FOR ENDOCRINE, NUTRITIONAL, METABOLIC AND IMMUNITY DISORDER: ICD-10-CM

## 2022-03-31 DIAGNOSIS — I25.5 ISCHEMIC CARDIOMYOPATHY: ICD-10-CM

## 2022-03-31 DIAGNOSIS — Z12.5 PROSTATE CANCER SCREENING: ICD-10-CM

## 2022-03-31 DIAGNOSIS — Z11.59 SCREENING FOR VIRAL DISEASE: ICD-10-CM

## 2022-03-31 DIAGNOSIS — E78.2 MIXED HYPERLIPIDEMIA: ICD-10-CM

## 2022-03-31 DIAGNOSIS — Z13.0 SCREENING FOR ENDOCRINE, NUTRITIONAL, METABOLIC AND IMMUNITY DISORDER: ICD-10-CM

## 2022-03-31 DIAGNOSIS — I25.2 HISTORY OF ST ELEVATION MYOCARDIAL INFARCTION (STEMI): ICD-10-CM

## 2022-03-31 DIAGNOSIS — F51.01 PRIMARY INSOMNIA: ICD-10-CM

## 2022-03-31 DIAGNOSIS — R51.9 SINUS HEADACHE: ICD-10-CM

## 2022-03-31 LAB
ALBUMIN SERPL-MCNC: 4 G/DL (ref 3.5–5)
ALBUMIN/GLOB SERPL: 1 {RATIO} (ref 1.1–2.2)
ALP SERPL-CCNC: 106 U/L (ref 45–117)
ALT SERPL-CCNC: 32 U/L (ref 12–78)
ANION GAP SERPL CALC-SCNC: 3 MMOL/L (ref 5–15)
AST SERPL-CCNC: 19 U/L (ref 15–37)
BASOPHILS # BLD: 0 K/UL (ref 0–0.1)
BASOPHILS NFR BLD: 1 % (ref 0–1)
BILIRUB SERPL-MCNC: 0.7 MG/DL (ref 0.2–1)
BUN SERPL-MCNC: 14 MG/DL (ref 6–20)
BUN/CREAT SERPL: 13 (ref 12–20)
CALCIUM SERPL-MCNC: 9.2 MG/DL (ref 8.5–10.1)
CHLORIDE SERPL-SCNC: 105 MMOL/L (ref 97–108)
CHOLEST SERPL-MCNC: 116 MG/DL
CO2 SERPL-SCNC: 29 MMOL/L (ref 21–32)
CREAT SERPL-MCNC: 1.11 MG/DL (ref 0.7–1.3)
DIFFERENTIAL METHOD BLD: NORMAL
EOSINOPHIL # BLD: 0.1 K/UL (ref 0–0.4)
EOSINOPHIL NFR BLD: 2 % (ref 0–7)
ERYTHROCYTE [DISTWIDTH] IN BLOOD BY AUTOMATED COUNT: 14.2 % (ref 11.5–14.5)
EST. AVERAGE GLUCOSE BLD GHB EST-MCNC: 123 MG/DL
GLOBULIN SER CALC-MCNC: 4 G/DL (ref 2–4)
GLUCOSE SERPL-MCNC: 94 MG/DL (ref 65–100)
HBA1C MFR BLD: 5.9 % (ref 4–5.6)
HCT VFR BLD AUTO: 44.4 % (ref 36.6–50.3)
HCV AB SERPL QL IA: NONREACTIVE
HDLC SERPL-MCNC: 32 MG/DL
HDLC SERPL: 3.6 {RATIO} (ref 0–5)
HGB BLD-MCNC: 14.7 G/DL (ref 12.1–17)
IMM GRANULOCYTES # BLD AUTO: 0 K/UL (ref 0–0.04)
IMM GRANULOCYTES NFR BLD AUTO: 0 % (ref 0–0.5)
LDLC SERPL CALC-MCNC: 54.6 MG/DL (ref 0–100)
LYMPHOCYTES # BLD: 2.2 K/UL (ref 0.8–3.5)
LYMPHOCYTES NFR BLD: 28 % (ref 12–49)
MCH RBC QN AUTO: 28.2 PG (ref 26–34)
MCHC RBC AUTO-ENTMCNC: 33.1 G/DL (ref 30–36.5)
MCV RBC AUTO: 85.1 FL (ref 80–99)
MONOCYTES # BLD: 0.5 K/UL (ref 0–1)
MONOCYTES NFR BLD: 6 % (ref 5–13)
NEUTS SEG # BLD: 4.9 K/UL (ref 1.8–8)
NEUTS SEG NFR BLD: 63 % (ref 32–75)
NRBC # BLD: 0 K/UL (ref 0–0.01)
NRBC BLD-RTO: 0 PER 100 WBC
PLATELET # BLD AUTO: 254 K/UL (ref 150–400)
PMV BLD AUTO: 11.2 FL (ref 8.9–12.9)
POTASSIUM SERPL-SCNC: 4.6 MMOL/L (ref 3.5–5.1)
PROT SERPL-MCNC: 8 G/DL (ref 6.4–8.2)
RBC # BLD AUTO: 5.22 M/UL (ref 4.1–5.7)
SODIUM SERPL-SCNC: 137 MMOL/L (ref 136–145)
TRIGL SERPL-MCNC: 147 MG/DL (ref ?–150)
TSH SERPL DL<=0.05 MIU/L-ACNC: 1.46 UIU/ML (ref 0.36–3.74)
VLDLC SERPL CALC-MCNC: 29.4 MG/DL
WBC # BLD AUTO: 7.7 K/UL (ref 4.1–11.1)

## 2022-03-31 PROCEDURE — 99214 OFFICE O/P EST MOD 30 MIN: CPT | Performed by: FAMILY MEDICINE

## 2022-03-31 RX ORDER — KETOCONAZOLE 20 MG/ML
10 SHAMPOO TOPICAL
Qty: 120 ML | Refills: 1 | Status: SHIPPED | OUTPATIENT
Start: 2022-04-01 | End: 2022-06-28 | Stop reason: ALTCHOICE

## 2022-03-31 RX ORDER — CHOLECALCIFEROL (VITAMIN D3) 125 MCG
5 CAPSULE ORAL
Qty: 30 TABLET | Refills: 1 | Status: SHIPPED | OUTPATIENT
Start: 2022-03-31 | End: 2022-06-28 | Stop reason: SDUPTHER

## 2022-03-31 NOTE — PROGRESS NOTES
HISTORY OF PRESENT ILLNESS  Al Walker is a 64 y.o. male. Patient was seen today for annual physical checkup, blood work, update health maintenance and concern of persistent sleep problem, headaches and allergies. Dandruff. HPI  Health Maintenance  Immunizations:    Influenza: up to date. Tetanus: unknown, record requested. Shingles: patient declined. Pneumonia: n/a. Cancer screening:     Colon: reviewed guidelines, up to date. Prostate: reviewed guidelines, order placed. Neurological Review  He is here today to talk about headaches.  This is a chronic problem.  Episodes are occuring several times per month and duration of individual headaches are gradual and a few hours   The pain is described as dull pain, bilateral in the frontal area, bilateral in the temporal area.  Associated symptoms: congestion, facial pain and decreased vision and eye strain. Denies fever, flashing lights, fluid retention, light sensitivity, loss of vision, motor impairment, nausea and vomiting Precipitating factors: patient is aware of none.  He denies a history of recent head injury.  Treatments for headaches currently include: antihistamine and nasal spray, which is not working well.  Sathya Lane has headache during night also and also in morning when he wakes up in morning.      Cardiovascular Review  The patient has hypertension, hyperlipidemia, coronary artery disease and history of prior MI, s/p stent. He reports taking medications as instructed, no medication side effects noted, no chest pain on exertion, no dyspnea on exertion, no swelling of ankles, no orthostatic dizziness or lightheadedness, no orthopnea or paroxysmal nocturnal dyspnea, no palpitations. Diet and Lifestyle: generally follows a low fat low cholesterol diet, generally follows a low sodium diet, exercises regularly, nonsmoker.   Lab review: no lab studies available for review at time of visit, patient had blood work with cardiology office, records requested. Medicines: Carvedilol 3.125 mg twice daily, atorvastatin 40 mg, Brilinta 90 mg, aspirin 81 mg. He is taking his valsartan only as needed. Insomnia:  ONSET: problem is longstanding  SEEN PREVIOUSLY: This is a new onset. DESCRIPTION OF SX:  difficulty initiating sleep.  frequent awakening  early morning awakening  ASSOCIATED ISSUES: chronic generalized anxiety  DENIES: depression and ETOH overuse  TREATMENT TO DATE: none        Also concerned about itchy scalp with dandruff. Patient Care Team:  Rukhsana Dodd MD as PCP - General (Family Medicine)  Rukhsana Dodd MD as PCP - [facility] Provider  Benjie Shah MD (Cardiology)       The following sections were reviewed & updated as appropriate: PMH, PSH, FH, and SH. Review of Systems   Constitutional: Negative for chills, fever and malaise/fatigue. HENT: Positive for congestion. Negative for ear pain, sore throat and tinnitus. Eyes: Negative for blurred vision, double vision, pain and discharge. Respiratory: Negative for cough, shortness of breath and wheezing. Cardiovascular: Negative for chest pain, palpitations and leg swelling. Gastrointestinal: Negative for abdominal pain, blood in stool, constipation, diarrhea, nausea and vomiting. Genitourinary: Negative for dysuria, frequency, hematuria and urgency. Musculoskeletal: Negative for back pain, joint pain and myalgias. Skin: Positive for itching (Scalp itching). Negative for rash. Neurological: Positive for headaches. Negative for dizziness, tremors and seizures. Endo/Heme/Allergies: Negative for polydipsia. Does not bruise/bleed easily. Psychiatric/Behavioral: Negative for depression and substance abuse. The patient has insomnia. The patient is not nervous/anxious. Physical Exam  Vitals and nursing note reviewed. Constitutional:       Appearance: He is well-developed. He is not diaphoretic.    HENT:      Head: Normocephalic and atraumatic. Right Ear: External ear normal.      Mouth/Throat:      Pharynx: No oropharyngeal exudate. Eyes:      General: No scleral icterus. Conjunctiva/sclera: Conjunctivae normal.      Pupils: Pupils are equal, round, and reactive to light. Neck:      Thyroid: No thyromegaly. Vascular: No JVD. Cardiovascular:      Rate and Rhythm: Normal rate and regular rhythm. Heart sounds: Normal heart sounds. No murmur heard. Pulmonary:      Effort: Pulmonary effort is normal.      Breath sounds: Normal breath sounds. No wheezing. Abdominal:      General: Bowel sounds are normal. There is no distension. Palpations: Abdomen is soft. There is no mass. Musculoskeletal:         General: No tenderness. Normal range of motion. Cervical back: Normal range of motion and neck supple. Lymphadenopathy:      Cervical: No cervical adenopathy. Skin:     General: Skin is warm and dry. Findings: No rash. Comments: White scaly lesions on the scalp consistent with seborrheic dermatitis   Neurological:      Mental Status: He is alert and oriented to person, place, and time. Cranial Nerves: No cranial nerve deficit. Deep Tendon Reflexes: Reflexes are normal and symmetric. Reflexes normal.         ASSESSMENT and PLAN  Diagnoses and all orders for this visit:    1. Essential hypertension  -     METABOLIC PANEL, COMPREHENSIVE; Future    2. Mixed hyperlipidemia  -     LIPID PANEL; Future  -     METABOLIC PANEL, COMPREHENSIVE; Future    3. History of ST elevation myocardial infarction (STEMI)  -     LIPID PANEL; Future    4. Ischemic cardiomyopathy  -     LIPID PANEL; Future    5. Sinus headache    6. Environmental allergies  -     CBC WITH AUTOMATED DIFF; Future    7. Screening for endocrine, nutritional, metabolic and immunity disorder  -     CBC WITH AUTOMATED DIFF; Future  -     HEMOGLOBIN A1C WITH EAG; Future  -     TSH 3RD GENERATION; Future    8.  Prostate cancer screening  - PSA W/ REFLX FREE PSA; Future    9. Primary insomnia  -     melatonin 5 mg tablet; Take 1 Tablet by mouth nightly. 10. Seborrheic dermatitis of scalp  -     ketoconazole (NIZORAL) 2 % shampoo; Apply 10 mL to affected area BID Mon Wed & Fri.    11. Screening for viral disease  -     HEPATITIS C AB; Future    Discussed lifestyle issues and health guidance given  Patient was given an after visit summary which includes diagnoses, vital signs, current medications, instructions and references & authorized prescriptions . Results of labs will be conveyed to patient, once available. Pt verbalized instructions I provided and expressed understanding of discussion that was held today. Please note that this dictation was completed with [employer], the computer voice recognition software. Quite often unanticipated grammatical, syntax, homophones, and other interpretive errors are inadvertently transcribed by the computer software. Please disregard these errors. Please excuse any errors that have escaped final proofreading. Thank you.

## 2022-03-31 NOTE — PROGRESS NOTES
Chief Complaint   Patient presents with    Headache     can't get enough sleep at night    Skin Problem     on skull like dandruff    Back Pain     since today morning         1. \"Have you been to the ER, urgent care clinic since your last visit? Hospitalized since your last visit? \" No    2. \"Have you seen or consulted any other health care providers outside of the [address] [place] [address] since your last visit? \" No     3. For patients aged 39-70: Has the patient had a colonoscopy / FIT/ Cologuard? No      If the patient is female:    4. For patients aged 41-77: Has the patient had a mammogram within the past 2 years? NA - based on age or sex      11. For patients aged 21-65: Has the patient had a pap smear?  NA - based on age or sex         3 most recent PHQ Screens 3/31/2022   Little interest or pleasure in doing things Not at all   Feeling down, depressed, irritable, or hopeless Not at all   Total Score PHQ 2 0       Health Maintenance Due   Topic Date Due    Hepatitis C Screening  Never done    DTaP/Tdap/Td series (1 - Tdap) Never done    Shingrix Vaccine Age 50> (1 of 2) Never done    Flu Vaccine (1) 09/01/2021    Lipid Screen  09/24/2021

## 2022-03-31 NOTE — PATIENT INSTRUCTIONS
Learning About Sleeping Well  What does sleeping well mean? Sleeping well means getting enough sleep to feel good and stay healthy. How much sleep is enough varies among people. The number of hours you sleep and how you feel when you wake up are both important. If you do not feel refreshed, you probably need more sleep. Another sign of not getting enough sleep is feeling tired during the day. Experts recommend that adults get at least 7 or more hours of sleep per day. Children and older adults need more sleep. Why is getting enough sleep important? Getting enough quality sleep is a basic part of good health. When your sleep suffers, your physical health, mood, and your thoughts can suffer too. You may find yourself feeling more grumpy or stressed. Not getting enough sleep also can lead to serious problems, including injury, accidents, anxiety, and depression. What might cause poor sleeping? Many things can cause sleep problems, including:  · Changes to your sleep schedule. · Stress. Stress can be caused by fear about a single event, such as giving a speech. Or you may have ongoing stress, such as worry about work or school. · Depression, anxiety, and other mental or emotional conditions. · Changes in your sleep habits or surroundings. This includes changes that happen where you sleep, such as noise, light, or sleeping in a different bed. It also includes changes in your sleep pattern, such as having jet lag or working a late shift. · Health problems, such as pain, breathing problems, and restless legs syndrome. · Lack of regular exercise. · Using alcohol, nicotine, or caffeine before bed. How can you help yourself? Here are some tips that may help you sleep more soundly and wake up feeling more refreshed. Your sleeping area   · Use your bedroom only for sleeping and sex. A bit of light reading may help you fall asleep. But if it doesn't, do your reading elsewhere in the house.  Try not to use your TV, computer, smartphone, or tablet while you are in bed. · Be sure your bed is big enough to stretch out comfortably, especially if you have a sleep partner. · Keep your bedroom quiet, dark, and cool. Use curtains, blinds, or a sleep mask to block out light. To block out noise, use earplugs, soothing music, or a \"white noise\" machine. Your evening and bedtime routine   · Create a relaxing bedtime routine. You might want to take a warm shower or bath, or listen to soothing music. · Go to bed at the same time every night. And get up at the same time every morning, even if you feel tired. What to avoid   · Limit caffeine (coffee, tea, caffeinated sodas) during the day, and don't have any for at least 6 hours before bedtime. · Avoid drinking alcohol before bedtime. Alcohol can cause you to wake up more often during the night. · Try not to smoke or use tobacco, especially in the evening. Nicotine can keep you awake. · Limit naps during the day, especially close to bedtime. · Avoid lying in bed awake for too long. If you can't fall asleep or if you wake up in the middle of the night and can't get back to sleep within about 20 minutes, get out of bed and go to another room until you feel sleepy. · Avoid taking medicine right before bed that may keep you awake or make you feel hyper or energized. Your doctor can tell you if your medicine may do this and if you can take it earlier in the day. If you can't sleep   · Imagine yourself in a peaceful, pleasant scene. Focus on the details and feelings of being in a place that is relaxing. · Get up and do a quiet or boring activity until you feel sleepy. · Avoid drinking any liquids before going to bed to help prevent waking up often to use the bathroom. Where can you learn more? Go to http://www.gray.com/  Enter I093 in the search box to learn more about \"Learning About Sleeping Well. \"  Current as of: June 16, 2021               Content Version: 13.2  © 6526-0066 Healthwise, Incorporated. Care instructions adapted under license by [employer] (which disclaims liability or warranty for this information). If you have questions about a medical condition or this instruction, always ask your healthcare professional. [address] any warranty or liability for your use of this information.

## 2022-04-01 ENCOUNTER — TELEPHONE (OUTPATIENT)
Dept: FAMILY MEDICINE CLINIC | Age: 57
End: 2022-04-01

## 2022-04-01 LAB
PSA SERPL-MCNC: 0.4 NG/ML (ref 0–4)
REFLEX CRITERIA: NORMAL

## 2022-04-01 NOTE — PROGRESS NOTES
Called patient, name and  verified. Patient informed of lab results and recommendations per provider. Patient said he once was on Zetia and it got switched. He want to resume taking Zetia because he believes it helps his blood sugar.

## 2022-04-01 NOTE — PROGRESS NOTES
Hello MrYue Mcintyre,  I have reviewed your results. All results including kidney and liver function, blood count, cholesterol profile, screening for hepatitis C, thyroid function are normal.  A1c, average sugar is in early prediabetic range. Please make sure you watch your diet and limit carbohydrates/sweets. Let me know if you have any questions, thanks.

## 2022-04-01 NOTE — TELEPHONE ENCOUNTER
Called, spoke to pt. Two pt identifiers confirmed. Patient stated that after Midnight making gas, but when he is getting up it is a small amount. Appointment schedule.

## 2022-04-01 NOTE — TELEPHONE ENCOUNTER
Patient called and stated that any kind of food is causing him to have indigestion and he's not able to use the restroom.     Requesting a call back    Best call back# [phone]

## 2022-04-04 NOTE — PROGRESS NOTES
Outbound call to patient. Name and  verified. informed that Zetia is cholesterol medicine. He is already on high-dose of statin and cholesterol results are excellent. He needs to be on special sugar medication like Metformin. Pt stated that he will discuss this with Daniel Rodriguez on his upcoming appointment.

## 2022-04-27 ENCOUNTER — OFFICE VISIT (OUTPATIENT)
Dept: FAMILY MEDICINE CLINIC | Age: 57
End: 2022-04-27
Payer: COMMERCIAL

## 2022-04-27 VITALS
DIASTOLIC BLOOD PRESSURE: 78 MMHG | TEMPERATURE: 98.5 F | OXYGEN SATURATION: 100 % | RESPIRATION RATE: 16 BRPM | WEIGHT: 159.4 LBS | HEIGHT: 69 IN | HEART RATE: 72 BPM | BODY MASS INDEX: 23.61 KG/M2 | SYSTOLIC BLOOD PRESSURE: 116 MMHG

## 2022-04-27 DIAGNOSIS — M25.552 CHRONIC LEFT HIP PAIN: Primary | ICD-10-CM

## 2022-04-27 DIAGNOSIS — G89.29 CHRONIC LEFT HIP PAIN: Primary | ICD-10-CM

## 2022-04-27 DIAGNOSIS — M89.8X5 LYTIC BONE LESION OF HIP: ICD-10-CM

## 2022-04-27 DIAGNOSIS — M70.72 ILIOPSOAS BURSITIS OF LEFT HIP: ICD-10-CM

## 2022-04-27 PROCEDURE — 99214 OFFICE O/P EST MOD 30 MIN: CPT | Performed by: FAMILY MEDICINE

## 2022-04-27 RX ORDER — ACETAMINOPHEN 500 MG
500 TABLET ORAL
Qty: 60 TABLET | Refills: 0 | Status: SHIPPED | OUTPATIENT
Start: 2022-04-27 | End: 2022-05-17

## 2022-04-27 RX ORDER — EZETIMIBE 10 MG/1
TABLET ORAL
COMMUNITY
Start: 2022-04-04

## 2022-04-27 NOTE — PATIENT INSTRUCTIONS
Hip Bursitis: Exercises  Introduction  Here are some examples of exercises for you to try. The exercises may be suggested for a condition or for rehabilitation. Start each exercise slowly. Ease off the exercises if you start to have pain. You will be told when to start these exercises and which ones will work best for you. How to do the exercises  Hip rotator stretch    1. Lie on your back with both knees bent and your feet flat on the floor. 2. Put the ankle of your affected leg on your opposite thigh near your knee. 3. Use your hand to gently push your knee away from your body until you feel a gentle stretch around your hip. 4. Hold the stretch for 15 to 30 seconds. 5. Repeat 2 to 4 times. 6. Repeat steps 1 through 5, but this time use your hand to gently pull your knee toward your opposite shoulder. Iliotibial band stretch    1. Lean sideways against a wall. If you are not steady on your feet, hold on to a chair or counter. 2. Stand on the leg with the affected hip, with that leg close to the wall. Then cross your other leg in front of it. 3. Let your affected hip drop out to the side of your body and against wall. Then lean away from your affected hip until you feel a stretch. 4. Hold the stretch for 15 to 30 seconds. 5. Repeat 2 to 4 times. Straight-leg raises to the outside    1. Lie on your side, with your affected hip on top. 2. Tighten the front thigh muscles of your top leg to keep your knee straight. 3. Keep your hip and your leg straight in line with the rest of your body, and keep your knee pointing forward. Do not drop your hip back. 4. Lift your top leg straight up toward the ceiling, about 12 inches off the floor. Hold for about 6 seconds, then slowly lower your leg. 5. Repeat 8 to 12 times. Clamshell    1. Lie on your side, with your affected hip on top and your head propped on a pillow. Keep your feet and knees together and your knees bent.   2. Raise your top knee, but keep your feet together. Do not let your hips roll back. Your legs should open up like a clamshell. 3. Hold for 6 seconds. 4. Slowly lower your knee back down. Rest for 10 seconds. 5. Repeat 8 to 12 times. Follow-up care is a key part of your treatment and safety. Be sure to make and go to all appointments, and call your doctor if you are having problems. It's also a good idea to know your test results and keep a list of the medicines you take. Where can you learn more? Go to http://www.lin.com/  Enter H674 in the search box to learn more about \"Hip Bursitis: Exercises. \"  Current as of: July 1, 2021               Content Version: 13.2  © 2006-2022 Healthwise, Incorporated. Care instructions adapted under license by [employer] (which disclaims liability or warranty for this information). If you have questions about a medical condition or this instruction, always ask your healthcare professional. [place] [address] any warranty or liability for your use of this information.

## 2022-04-27 NOTE — PROGRESS NOTES
HISTORY OF PRESENT ILLNESS  Amor Marin is a 62 y.o. male. Patient was seen today for on and off pain in left hip since he was almost 15years old. HPI  Hip Pain  Patient complains of left hip pain. Onset of the symptoms was problem is longstanding, this episode began a few days ago. Inciting event: none. Current symptoms include left sided hip pain. Severity = moderate  location: anterior, groin  radiates to: left inguinal region, groin. Associated symptoms: Sharp spasm of left lower extremity. Aggravating symptoms: none known.,  Sometimes he notices at nighttime. Patient's overall course: intermittent. Patient has had prior hip problems. Previous visits for this problem: none. 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 Review of Systems   Constitutional: Negative for chills, fever and malaise/fatigue. HENT: Negative for congestion, ear pain, sore throat and tinnitus. Eyes: Negative for blurred vision, double vision, pain and discharge. Respiratory: Negative for cough, shortness of breath and wheezing. Cardiovascular: Negative for chest pain, palpitations and leg swelling. Gastrointestinal: Negative for abdominal pain, blood in stool, constipation, diarrhea, nausea and vomiting. Genitourinary: Negative for dysuria, frequency, hematuria and urgency. Musculoskeletal: Negative for back pain, joint pain and myalgias. Left hip pain   Skin: Negative for rash. Neurological: Negative for dizziness, tremors, seizures and headaches. Endo/Heme/Allergies: Negative for polydipsia. Does not bruise/bleed easily. Psychiatric/Behavioral: Negative for depression and substance abuse. The patient is not nervous/anxious. Physical Exam  Vitals and nursing note reviewed. Constitutional:       Appearance: He is well-developed. He is not diaphoretic.    HENT: Head: Normocephalic and atraumatic. Right Ear: External ear normal.      Mouth/Throat:      Pharynx: No oropharyngeal exudate. Eyes:      General: No scleral icterus. Conjunctiva/sclera: Conjunctivae normal.      Pupils: Pupils are equal, round, and reactive to light. Neck:      Thyroid: No thyromegaly. Vascular: No JVD. Cardiovascular:      Rate and Rhythm: Normal rate and regular rhythm. Heart sounds: Normal heart sounds. No murmur heard. Pulmonary:      Effort: Pulmonary effort is normal.      Breath sounds: Normal breath sounds. No wheezing. Abdominal:      General: Bowel sounds are normal. There is no distension. Palpations: Abdomen is soft. There is no mass. Musculoskeletal:         General: Normal range of motion. Cervical back: Normal range of motion and neck supple. Left hip: Tenderness present. No bony tenderness. Legs:    Lymphadenopathy:      Cervical: No cervical adenopathy. Skin:     General: Skin is warm and dry. Findings: No rash. Neurological:      Mental Status: He is alert and oriented to person, place, and time. Cranial Nerves: No cranial nerve deficit. Deep Tendon Reflexes: Reflexes are normal and symmetric. Reflexes normal.         ASSESSMENT and PLAN  Diagnoses and all orders for this visit:    1. Chronic left hip pain  -     XR HIP LT W OR WO PELV 2-3 VWS; Future  -     acetaminophen (TYLENOL) 500 mg tablet; Take 1 Tablet by mouth every eight (8) hours as needed for Pain.  -     REFERRAL TO ORTHOPEDICS  -     MRI HIP LT W CONT; Future    2. Iliopsoas bursitis of left hip  -     acetaminophen (TYLENOL) 500 mg tablet; Take 1 Tablet by mouth every eight (8) hours as needed for Pain. 3. Lytic bone lesion of hip  -     REFERRAL TO ORTHOPEDICS  -     MRI HIP LT W CONT; Future    Will refer to orthopedic doctor for further evaluation as well as MRI of the hip.   Discussed lifestyle issues and health guidance given  Patient was given an after visit summary which includes diagnoses, vital signs, current medications, instructions and references & authorized prescriptions . Results of labs will be conveyed to patient, once available. Pt verbalized instructions I provided and expressed understanding of discussion that was held today. Please note that this dictation was completed with NextGxDX, the computer voice recognition software. Quite often unanticipated grammatical, syntax, homophones, and other interpretive errors are inadvertently transcribed by the computer software. Please disregard these errors. Please excuse any errors that have escaped final proofreading. Thank you.

## 2022-04-27 NOTE — PROGRESS NOTES
Chief Complaint   Patient presents with    Leg Pain     left leg left hip pain on/off. pt has a past history of this complain x1 week pain is back and worse. pt feels there spasms but it goes away after 15 mins         1. \"Have you been to the ER, urgent care clinic since your last visit? Hospitalized since your last visit? \" No    2. \"Have you seen or consulted any other health care providers outside of the [address] [place] [address] since your last visit? \" No     3. For patients aged 39-70: Has the patient had a colonoscopy / FIT/ Cologuard? No      If the patient is female:    4. For patients aged 41-77: Has the patient had a mammogram within the past 2 years? NA - based on age or sex      11. For patients aged 21-65: Has the patient had a pap smear?  NA - based on age or sex         3 most recent PHQ Screens 4/27/2022   Little interest or pleasure in doing things Not at all   Feeling down, depressed, irritable, or hopeless Not at all   Total Score PHQ 2 0       Health Maintenance Due   Topic Date Due    DTaP/Tdap/Td series (1 - Tdap) Never done    Shingrix Vaccine Age 50> (1 of 2) Never done

## 2022-04-28 ENCOUNTER — TELEPHONE (OUTPATIENT)
Dept: FAMILY MEDICINE CLINIC | Age: 57
End: 2022-04-28

## 2022-04-28 NOTE — TELEPHONE ENCOUNTER
Please let him know that first he needs to schedule MRI. Once he is scheduled distressingly message on patient portal and I will send a prescription to pharmacy. Also he needs to call orthopedic specialist to set up an appointment as soon as possible.   Thanks

## 2022-04-28 NOTE — TELEPHONE ENCOUNTER
----- Message from Foradakye Dupree sent at 4/28/2022 12:47 PM EDT -----  Subject: Message to Provider    QUESTIONS  Information for Provider? Calling about the MRI. He has claustrophobia and   will need a medication to help.   ---------------------------------------------------------------------------  --------------  CALL BACK INFO  What is the best way for the office to contact you? OK to leave message on   voicemail  Preferred Call Back Phone Number? [phone]  ---------------------------------------------------------------------------  --------------  SCRIPT ANSWERS  Relationship to Patient?  Self

## 2022-04-28 NOTE — TELEPHONE ENCOUNTER
Outbound call to patient. Name and  verified. Informed pt to schedule MRI appointment first and then call us back to send Rx to pharmacy.

## 2022-04-29 DIAGNOSIS — F40.240 CLAUSTROPHOBIA: Primary | ICD-10-CM

## 2022-04-29 RX ORDER — ALPRAZOLAM 0.5 MG/1
0.5 TABLET ORAL
Qty: 2 TABLET | Refills: 0 | Status: SHIPPED | OUTPATIENT
Start: 2022-04-29 | End: 2022-04-29

## 2022-04-29 NOTE — PROGRESS NOTES
Please inform patient that I have sent prescription of Xanax to pharmacy. To take half hour before MRI.   Thanks

## 2022-05-03 NOTE — PROGRESS NOTES
Called patient. Two patient identifiers verified. Informed Rx sent to the pharmacy. to take half an hour before MRI

## 2022-05-09 ENCOUNTER — TELEPHONE (OUTPATIENT)
Dept: FAMILY MEDICINE CLINIC | Age: 57
End: 2022-05-09

## 2022-05-09 NOTE — TELEPHONE ENCOUNTER
Please recommend patient to follow-up with Dr. Marty Singh, as early as possible. Insurance is denying for MRI. Orthopedic specialist will do appropriate orders and also the practice has own MRI.

## 2022-05-09 NOTE — TELEPHONE ENCOUNTER
Outbound call to [employer], provided pt's name and . They stated that Hip MRI is denied by insurance because pt has to at least have 6 weeks of treatment trial before having MRI. Informed that We didn't get a definitive diagnosis from X ray that's why MRI was ordered to confirm diagnosis. The case is still denied. They need proof that pt had treatment for 6 weeks.

## 2022-05-09 NOTE — TELEPHONE ENCOUNTER
Outbound call to patient. Name and  verified. recommended to follow up with [facility] as MRI was denied by insurance.

## 2022-05-09 NOTE — TELEPHONE ENCOUNTER
Misha Coleman calling from [facility] care about a prior- auth for XR HIP LT [address]. 2-3 VWS: Result Notes     [place] [address] [phone]   Choose option 4    reference case number [id number]

## 2022-05-12 ENCOUNTER — OFFICE VISIT (OUTPATIENT)
Dept: ORTHOPEDIC SURGERY | Age: 57
End: 2022-05-12
Payer: COMMERCIAL

## 2022-05-12 VITALS — WEIGHT: 160 LBS | BODY MASS INDEX: 23.7 KG/M2 | HEIGHT: 69 IN

## 2022-05-12 DIAGNOSIS — M89.8X5 LYTIC BONE LESION OF HIP: ICD-10-CM

## 2022-05-12 DIAGNOSIS — M25.552 HIP PAIN, LEFT: Primary | ICD-10-CM

## 2022-05-12 PROCEDURE — 99204 OFFICE O/P NEW MOD 45 MIN: CPT | Performed by: PHYSICIAN ASSISTANT

## 2022-05-12 NOTE — PROGRESS NOTES
Lyndsay Mcintyre (: 1965) is a 62 y.o. male, new patient, here for evaluation of the following chief complaint(s):  Hip Pain       SUBJECTIVE/OBJECTIVE:    Anju Cortez (: 1965) is a 62 y.o. male who presents today for evaluation of left hip pain which has been worsening over the last 2 months. Patient states symptoms initially began when he was in fifth grade. States he had sharp severe pain in the left hip and was bedridden for approximately 1 month. The patient states that after period of rest the pain gradually subsided. Patient has had intermittent left hip pain over the years. Patient describes sharp pain in the left groin and the lateral hip. Patient states symptoms significantly worsened over the past 1 to 2 months. Patient states often pain wakes him up at night. Patient was evaluated by his primary care physician who obtained an x-ray and a lytic lesion in the left pelvis was seen on x-ray. Patient referred to our office for further work-up. Patient denies numbness, tingling or weakness in the left lower extremity. Patient denies low back pain. The patient has been taking Tylenol on an as-needed basis and rates his average daily pain level as a 5-6/10. Patient denies saddle paresthesia/anesthesia. Allergies   Allergen Reactions    Valsartan Other (comments)     Neck pain       Current Outpatient Medications   Medication Sig    ezetimibe (ZETIA) 10 mg tablet TAKE 1 TABLET BY MOUTH EVERY DAY (TAKE ASPIRIN 30MIN PRIOR)    acetaminophen (TYLENOL) 500 mg tablet Take 1 Tablet by mouth every eight (8) hours as needed for Pain.  ketoconazole (NIZORAL) 2 % shampoo Apply 10 mL to affected area BID Mon Wed & Fri.    melatonin 5 mg tablet Take 1 Tablet by mouth nightly.  carvediloL (COREG) 3.125 mg tablet Take 3.125 mg by mouth two (2) times daily (with meals).  valsartan (DIOVAN) 40 mg tablet Take  by mouth daily.     acetaminophen/diphenhydramine (TYLENOL PM PO) Take  by mouth. (Patient not taking: Reported on 3/31/2022)    azelastine (ASTELIN) 137 mcg (0.1 %) nasal spray 2 Sprays by Both Nostrils route nightly. Use in each nostril as directed    fexofenadine (ALLEGRA) 180 mg tablet Take 1 Tablet by mouth daily.  aspirin delayed-release 81 mg tablet TAKE 1 TABLET BY MOUTH EVERY DAY    atorvastatin (LIPITOR) 40 mg tablet TAKE 1 TABLET BY MOUTH AT BEDTIME    Brilinta 90 mg tablet TAKE 1 TABLET BY MOUTH TWICE A DAY     No current facility-administered medications for this visit. Social History     Socioeconomic History    Marital status:      Spouse name: Not on file    Number of children: Not on file    Years of education: Not on file    Highest education level: Not on file   Occupational History    Not on file   Tobacco Use    Smoking status: Never Smoker    Smokeless tobacco: Never Used   Vaping Use    Vaping Use: Never used   Substance and Sexual Activity    Alcohol use: No    Drug use: No    Sexual activity: Not on file   Other Topics Concern    Not on file   Social History Narrative    Not on file     Social Determinants of Health     Financial Resource Strain:     Difficulty of Paying Living Expenses: Not on file   Food Insecurity:     Worried About Running Out of Food in the Last Year: Not on file    Juan of Food in the Last Year: Not on file   Transportation Needs:     Lack of Transportation (Medical): Not on file    Lack of Transportation (Non-Medical):  Not on file   Physical Activity:     Days of Exercise per Week: Not on file    Minutes of Exercise per Session: Not on file   Stress:     Feeling of Stress : Not on file   Social Connections:     Frequency of Communication with Friends and Family: Not on file    Frequency of Social Gatherings with Friends and Family: Not on file    Attends Mosque Services: Not on file    Active Member of Clubs or Organizations: Not on file    Attends Club or Organization Meetings: Not on file  Marital Status: Not on file   Intimate Partner Violence:     Fear of Current or Ex-Partner: Not on file    Emotionally Abused: Not on file    Physically Abused: Not on file    Sexually Abused: Not on file   Housing Stability:     Unable to Pay for Housing in the Last Year: Not on file    Number of Places Lived in the Last Year: Not on file    Unstable Housing in the Last Year: Not on file       History reviewed. No pertinent surgical history. Family History   Problem Relation Age of Onset    Heart Disease Mother     Cancer Father     Hypertension Sister     Heart Disease Sister     Hypertension Brother     Hypertension Sister     Hypertension Sister     Hypertension Sister     Hypertension Sister     Hypertension Brother     Heart Disease Brother                REVIEW OF SYSTEMS:    Patient denies any recent fever, chills, nausea, vomiting, chest pain, or shortness of breath. Vitals:    Ht 5' 9\" (1.753 m)   Wt 160 lb (72.6 kg)   BMI 23.63 kg/m²    Body mass index is 23.63 kg/m². PHYSICAL EXAM:    The patient is alert and oriented x3 and in no acute distress. The patient ambulates with a normal gait without gait aids. Physical examination of the bilateral hips demonstrates hip flexion to 100 degrees with 20 degrees internal rotation 50 degrees external rotation without discomfort on the right and elicits pain with flexion internal rotation on the left. Negative Stinchfield, negative logroll, negative impingement. Distal motor and sensation intact. Sensory testing in all major nerve distributions in the lower extremities is intact and symmetric. Manual motor testing of the major muscle groups of the lower extremities including dorsiflexion/EHL/ plantarflexion, knee flexion/extension, hip flexion/extension/abduction/ adduction is intact and symmetric in the bilateral lower extremities. Deep tendon reflexes +2/4 and symmetric in the bilateral knees and ankles.   Hip range of motion is pain-free bilaterally. Negative straight leg raise bilaterally. No evidence of clonus bilaterally. Babinski's downgoing and symmetric bilaterally. Distal pulses intact and symmetric bilaterally. There is no tenderness to palpation of the thoracic, lumbar or sacral regions. IMAGING:    Results from Appointment encounter on 05/12/22    XR HIP LT W OR WO PELV 2-3 VWS    Narrative  AP pelvis and frog lateral digital radiograph of the left hip obtained in the office today was reviewed and demonstrate left hip lucency and sclerosis in the superior left acetabulum and left iliac bone. Sclerosis and lucency measures 6 cm x 3 cm. 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 Orders Placed This Encounter    XR HIP LT W OR WO PELV 2-3 VWS     Standing Status:   Future     Number of Occurrences:   1     Standing Expiration Date:   5/13/2023    MRI PELV W WO CONT     Assess lytic lesion left hip     Standing Status:   Future     Standing Expiration Date:   6/12/2023     Order Specific Question:   STAT Creatinine as indicated     Answer: Yes    NM BONE SCAN [address] BODY     Lytic lesion left pelvis     Standing Status:   Future     Standing Expiration Date:   6/12/2023          ASSESSMENT/PLAN:      1. Hip pain, left  -     XR HIP LT W OR WO PELV 2-3 VWS; Future  -     MRI PELV W WO CONT; Future  -     NM BONE SCAN [address] BODY; Future        Below is the assessment and plan developed based on review of pertinent history, physical exam, labs, studies, and medications. Have discussed the patient's diagnosis and radiographic findings at length and have answered all patient questions to his satisfaction. Have referred the patient for MRI pelvis with and without contrast and whole-body bone scan. Patient was instructed to continue Tylenol OTC NSAIDs on an as-needed basis.   Have asked patient follow-up in 1 month's time for reevaluation with Dr. Earnest Diaz. Anticipate that we should be able to obtain both studies prior to his follow-up. The patient understands and agrees to the treatment plan as outlined above. .       Return in about 4 weeks (around 6/9/2022) for follow up with Frida. Dr. Earnest Diaz was available for immediate consultation as needed. An electronic signature was used to authenticate this note.   -- Martha Gonzalez PA-C

## 2022-05-14 ENCOUNTER — HOSPITAL ENCOUNTER (OUTPATIENT)
Dept: CT IMAGING | Age: 57
Discharge: HOME | End: 2022-05-14
Attending: FAMILY MEDICINE
Payer: COMMERCIAL

## 2022-05-14 DIAGNOSIS — R91.1 INCIDENTAL PULMONARY NODULE, > 3MM AND < 8MM: ICD-10-CM

## 2022-05-14 PROCEDURE — 71250 CT THORAX DX C-: CPT

## 2022-05-16 ENCOUNTER — TELEPHONE (OUTPATIENT)
Dept: FAMILY MEDICINE CLINIC | Age: 57
End: 2022-05-16

## 2022-05-16 DIAGNOSIS — M70.72 ILIOPSOAS BURSITIS OF LEFT HIP: ICD-10-CM

## 2022-05-16 DIAGNOSIS — M25.552 CHRONIC LEFT HIP PAIN: ICD-10-CM

## 2022-05-16 DIAGNOSIS — G89.29 CHRONIC LEFT HIP PAIN: ICD-10-CM

## 2022-05-16 NOTE — PROGRESS NOTES
Miguel Mcintyre,  I have reviewed results for CT chest.  It shows 6 mm right lower lobe lung nodule, no change from CT scan you had in [facility] Dr. LUZ CHRISTIAN.    It is not concerning at present but depending on your MRI report of hip and bone scan, we will decide when will need to have a repeat CT chest.  Please let me know if you have any questions

## 2022-05-16 NOTE — TELEPHONE ENCOUNTER
----- Message from Shaistajose Efren sent at 5/16/2022  9:32 AM EDT -----  Subject: Results Request    QUESTIONS  Which lab or imaging result is the patient calling about? CT Scan   Which provider ordered the test? Vinnie Hope   At what location was the test performed? Date the test was performed? 2022-05-14  Additional Information for Provider? Patient would like to speak with Dr. Corwin Tyson regarding CT scan results. Please advise  ---------------------------------------------------------------------------  --------------  CALL BACK INFO  What is the best way for the office to contact you? OK to leave message on   voicemail  Preferred Call Back Phone Number? [phone]  ---------------------------------------------------------------------------  --------------  SCRIPT ANSWERS  Relationship to Patient?  Self

## 2022-05-16 NOTE — TELEPHONE ENCOUNTER
Outbound call to patient. Name and  verified. Informed that results haven't been reviewed yet. Will call once results are reviewed and commented on by provider.

## 2022-05-17 ENCOUNTER — OFFICE VISIT (OUTPATIENT)
Dept: FAMILY MEDICINE CLINIC | Age: 57
End: 2022-05-17
Payer: COMMERCIAL

## 2022-05-17 VITALS
SYSTOLIC BLOOD PRESSURE: 107 MMHG | HEIGHT: 69 IN | HEART RATE: 70 BPM | OXYGEN SATURATION: 99 % | BODY MASS INDEX: 23.58 KG/M2 | WEIGHT: 159.2 LBS | TEMPERATURE: 98.3 F | DIASTOLIC BLOOD PRESSURE: 72 MMHG | RESPIRATION RATE: 18 BRPM

## 2022-05-17 DIAGNOSIS — R20.2 NUMBNESS AND TINGLING IN RIGHT HAND: Primary | ICD-10-CM

## 2022-05-17 DIAGNOSIS — R20.0 NUMBNESS AND TINGLING IN RIGHT HAND: Primary | ICD-10-CM

## 2022-05-17 DIAGNOSIS — R10.13 DYSPEPSIA: ICD-10-CM

## 2022-05-17 DIAGNOSIS — G56.01 CARPAL TUNNEL SYNDROME OF RIGHT WRIST: ICD-10-CM

## 2022-05-17 PROCEDURE — 99213 OFFICE O/P EST LOW 20 MIN: CPT | Performed by: FAMILY MEDICINE

## 2022-05-17 RX ORDER — FAMOTIDINE 40 MG/1
40 TABLET, FILM COATED ORAL
Qty: 15 TABLET | Refills: 0 | Status: SHIPPED | OUTPATIENT
Start: 2022-05-17 | End: 2022-06-01

## 2022-05-17 RX ORDER — ACETAMINOPHEN 500 MG
TABLET ORAL
Qty: 60 TABLET | Refills: 0 | Status: SHIPPED | OUTPATIENT
Start: 2022-05-17 | End: 2022-06-01

## 2022-05-17 NOTE — PATIENT INSTRUCTIONS
Indigestion (Dyspepsia or Heartburn): Care Instructions  Your Care Instructions  Sometimes it can be hard to pinpoint the cause of indigestion. (It is also called dyspepsia or heartburn.) Most cases of an upset stomach with bloating, burning, burping, and nausea are minor and go away within several hours. Home treatment and over-the-counter medicine often are able to control symptoms. But if you take medicine to relieve your indigestion without making diet and lifestyle changes, your symptoms are likely to return again and again. If you get indigestion often, it may be a sign of a more serious medical problem. Be sure to follow up with your doctor, who may want to do tests to be sure of the cause of your indigestion. Follow-up care is a key part of your treatment and safety. Be sure to make and go to all appointments, and call your doctor if you are having problems. It's also a good idea to know your test results and keep a list of the medicines you take. How can you care for yourself at home? · Your doctor may recommend over-the-counter medicine. For mild or occasional indigestion, antacids such as Gaviscon, Mylanta, Maalox, or Tums, may help. Be safe with medicines. Be careful when you take over-the-counter antacid medicines. Many of these medicines have aspirin in them. Read the label to make sure that you are not taking more than the recommended dose. Too much aspirin can be harmful. · Your doctor also may recommend over-the-counter acid reducers, such as Pepcid AC (famotidine), Tagamet HB (cimetidine), or Prilosec (omeprazole). Read and follow all instructions on the label. If you use these medicines often, talk with your doctor. · Change your eating habits. ? It's best to eat several small meals instead of two or three large meals. ? After you eat, wait 2 to 3 hours before you lie down. ? Avoid foods that make your symptoms worse.  These may include chocolate, mint, alcohol, pepper, spicy foods, high-fat foods, or drinks with caffeine in them, such as tea, coffee, edgardo, or energy drinks. If your symptoms are worse after you eat a certain food, you may want to stop eating it to see if your symptoms get better. · Do not smoke or chew tobacco. Smoking can make GERD worse. If you need help quitting, talk to your doctor about stop-smoking programs and medicines. These can increase your chances of quitting for good. · If you have GERD symptoms at night, raise the head of your bed 6 to 8 inches. You can do this by putting the frame on blocks or placing a foam wedge under the head of your mattress. (Adding extra pillows does not work.)  · Do not wear tight clothing around your middle. · Lose weight if you need to. Losing just 5 to 10 pounds can help. · Do not take anti-inflammatory medicines, such as aspirin, ibuprofen (Advil, Motrin), or naproxen (Aleve). These can irritate the stomach. If you need a pain medicine, try acetaminophen (Tylenol), which does not cause stomach upset. When should you call for help? Call your doctor now or seek immediate medical care if:    · You have new or worse belly pain.     · You are vomiting. Watch closely for changes in your health, and be sure to contact your doctor if:    · You have new or worse symptoms of indigestion.     · You have trouble or pain swallowing.     · You are losing weight.     · You do not get better as expected. Where can you learn more? Go to http://www.gray.com/  Enter [id number] in the search box to learn more about \"Indigestion (Dyspepsia or Heartburn): Care Instructions. \"  Current as of: September 8, 2021               Content Version: 13.2  © 2801-4140 [employer]. Care instructions adapted under license by [employer] (which disclaims liability or warranty for this information).  If you have questions about a medical condition or this instruction, always ask your healthcare professional. [employer], [facility] disclaims any warranty or liability for your use of this information. Carpal Tunnel Syndrome: Exercises  Introduction  Here are some examples of exercises for you to try. The exercises may be suggested for a condition or for rehabilitation. Start each exercise slowly. Ease off the exercises if you start to have pain. You will be told when to start these exercises and which ones will work best for you. Warm-up stretches  When you no longer have pain or numbness, you can do exercises to help prevent carpal tunnel syndrome from coming back. Do not do any stretch or movement that is uncomfortable or painful. 1. Rotate your wrist up, down, and from side to side. Repeat 4 times. 2. Stretch your fingers far apart. Relax them, and then stretch them again. Repeat 4 times. 3. Stretch your thumb by pulling it back gently, holding it, and then releasing it. Repeat 4 times. How to do the exercises  Prayer stretch    1. Start with your palms together in front of your chest just below your chin. 2. Slowly lower your hands toward your waistline, keeping your hands close to your stomach and your palms together until you feel a mild to moderate stretch under your forearms. 3. Hold for at least 15 to 30 seconds. Repeat 2 to 4 times. Wrist flexor stretch    1. Extend your arm in front of you with your palm up. 2. Bend your wrist, pointing your hand toward the floor. 3. With your other hand, gently bend your wrist farther until you feel a mild to moderate stretch in your forearm. 4. Hold for at least 15 to 30 seconds. Repeat 2 to 4 times. Wrist extensor stretch    1. Repeat steps 1 through 4 of the stretch above, but begin with your extended hand palm down. Follow-up care is a key part of your treatment and safety. Be sure to make and go to all appointments, and call your doctor if you are having problems.  It's also a good idea to know your test results and keep a list of the medicines you take.  Where can you learn more? Go to http://www.gray.com/  Enter R021 in the search box to learn more about \"Carpal Tunnel Syndrome: Exercises. \"  Current as of: July 1, 2021               Content Version: 13.2  © 9629-2174 [employer]. Care instructions adapted under license by [employer] (which disclaims liability or warranty for this information). If you have questions about a medical condition or this instruction, always ask your healthcare professional. [address] any warranty or liability for your use of this information.

## 2022-05-17 NOTE — PROGRESS NOTES
HISTORY OF PRESENT ILLNESS  Gerry Ivan is a 62 y.o. male. Patient was seen today for numbness and tingling in right hand for few weeks. He is scheduled for bone scan for tomorrow as a part of work-up for osteolytic lesion of hip joint. HPI   Carpal Tunnel Syndrome  Patient presents for presents evaluation of hand paresthesias. Onset of the symptoms was several weeks ago. Current symptoms include numbness and tingling involving palmar and dorsal aspect of right thumb, index finger and middle finger. Patient's overall course: symptoms have progressed to a point and plateaued. . Patient has had no prior elbow problems. Previous visits for this problem: none. Evaluation to date: none. Treatment to date: none. Is also complaining of abdominal bloating and excessive gas with foods. His symptoms improved if his fasting. He denies nausea, constipation, diarrhea, blood in stool. He has not lost weight. Review of Systems   Constitutional: Negative for chills, fever and malaise/fatigue. HENT: Negative for congestion, ear pain, sore throat and tinnitus. Eyes: Negative for blurred vision, double vision, pain and discharge. Respiratory: Negative for cough, shortness of breath and wheezing. Cardiovascular: Negative for chest pain, palpitations and leg swelling. Gastrointestinal: Positive for heartburn. Negative for abdominal pain, blood in stool, constipation, diarrhea, nausea and vomiting. Dyspepsia   Genitourinary: Negative for dysuria, frequency, hematuria and urgency. Musculoskeletal: Negative for back pain, joint pain and myalgias. Skin: Negative for rash. Neurological: Negative for dizziness, tremors, seizures and headaches. Numbness and tingling involving fingers of right hand   Endo/Heme/Allergies: Negative for polydipsia. Does not bruise/bleed easily. Psychiatric/Behavioral: Negative for depression and substance abuse. The patient is not nervous/anxious.         Physical Exam  Vitals and nursing note reviewed. Constitutional:       Appearance: He is well-developed. He is not diaphoretic. HENT:      Head: Normocephalic and atraumatic. Right Ear: External ear normal.      Mouth/Throat:      Pharynx: No oropharyngeal exudate. Eyes:      General: No scleral icterus. Conjunctiva/sclera: Conjunctivae normal.      Pupils: Pupils are equal, round, and reactive to light. Neck:      Thyroid: No thyromegaly. Vascular: No JVD. Cardiovascular:      Rate and Rhythm: Normal rate and regular rhythm. Heart sounds: Normal heart sounds. No murmur heard. Pulmonary:      Effort: Pulmonary effort is normal.      Breath sounds: Normal breath sounds. No wheezing. Abdominal:      General: Bowel sounds are normal. There is no distension. Palpations: Abdomen is soft. There is no mass. Musculoskeletal:         General: No tenderness. Normal range of motion. Cervical back: Normal range of motion and neck supple. Comments: Right Wrist: Phalen's Test: Positive. Tinel's Test: Negative. Finkelstein's Test: Negative. Lymphadenopathy:      Cervical: No cervical adenopathy. Skin:     General: Skin is warm and dry. Findings: No rash. Neurological:      Mental Status: He is alert and oriented to person, place, and time. Cranial Nerves: No cranial nerve deficit. Deep Tendon Reflexes: Reflexes are normal and symmetric. Reflexes normal.         ASSESSMENT and PLAN  Diagnoses and all orders for this visit:    1. Numbness and tingling in right hand  -     VITAMIN B12; Future  -     TIARA COMPREHENSIVE PLUS PANEL; Future    2. Carpal tunnel syndrome of right wrist    3. Dyspepsia  -     famotidine (PEPCID) 40 mg tablet; Take 1 Tablet by mouth nightly. Patient with most likely carpal tunnel syndrome. Will check for autoimmune condition and vitamin deficiency.   Recommended to start carpal tunnel brace at nighttime as well as stretches those were demonstrated in office today. Started on H2 blocker for his dyspepsia symptoms. Discussed lifestyle issues and health guidance given  Patient was given an after visit summary which includes diagnoses, vital signs, current medications, instructions and references & authorized prescriptions . Results of labs will be conveyed to patient, once available. Pt verbalized instructions I provided and expressed understanding of discussion that was held today. Please note that this dictation was completed with [employer], the computer voice recognition software. Quite often unanticipated grammatical, syntax, homophones, and other interpretive errors are inadvertently transcribed by the computer software. Please disregard these errors. Please excuse any errors that have escaped final proofreading. Thank you.

## 2022-05-17 NOTE — PROGRESS NOTES
Called patient. Two patient identifiers verified. Discussed CT results per provider's note. He Verbalized understanding.

## 2022-05-17 NOTE — PROGRESS NOTES
Chief Complaint   Patient presents with    Numbness     right thumb and index tingling that goes all the way to the arm, its' like a spasm that is on/ off it happens suddendly and abruptly         1. \"Have you been to the ER, urgent care clinic since your last visit? Hospitalized since your last visit? \" No    2. \"Have you seen or consulted any other health care providers outside of the [address] [place] [address] since your last visit? \" No     3. For patients aged 39-70: Has the patient had a colonoscopy / FIT/ Cologuard? No      If the patient is female:    4. For patients aged 41-77: Has the patient had a mammogram within the past 2 years? NA - based on age or sex      11. For patients aged 21-65: Has the patient had a pap smear?  NA - based on age or sex         3 most recent PHQ Screens 5/17/2022   Little interest or pleasure in doing things Not at all   Feeling down, depressed, irritable, or hopeless Not at all   Total Score PHQ 2 0       Health Maintenance Due   Topic Date Due    DTaP/Tdap/Td series (1 - Tdap) Never done    Shingrix Vaccine Age 50> (1 of 2) Never done

## 2022-05-18 ENCOUNTER — HOSPITAL ENCOUNTER (OUTPATIENT)
Dept: NUCLEAR MEDICINE | Age: 57
Discharge: HOME | End: 2022-05-18
Attending: PHYSICIAN ASSISTANT
Payer: COMMERCIAL

## 2022-05-18 DIAGNOSIS — M25.552 HIP PAIN, LEFT: ICD-10-CM

## 2022-05-18 LAB — VIT B12 SERPL-MCNC: 557 PG/ML (ref 193–986)

## 2022-05-18 PROCEDURE — 78306 BONE IMAGING WHOLE BODY: CPT

## 2022-05-18 NOTE — PROGRESS NOTES
Miguel Cooney Milagros Robles,  Your vitamin B12 result is very normal.  Please let me know if you have any questions, thanks.

## 2022-05-19 LAB
CENTROMERE B AB SER-ACNC: <0.2 AI (ref 0–0.9)
CHROMATIN AB SERPL-ACNC: <0.2 AI (ref 0–0.9)
DSDNA AB SER-ACNC: 6 IU/ML (ref 0–9)
ENA JO1 AB SER-ACNC: <0.2 AI (ref 0–0.9)
ENA RNP AB SER-ACNC: 0.7 AI (ref 0–0.9)
ENA SCL70 AB SER-ACNC: <0.2 AI (ref 0–0.9)
ENA SM AB SER-ACNC: <0.2 AI (ref 0–0.9)
ENA SM+RNP AB SER-ACNC: <0.2 AI (ref 0–0.9)
ENA SS-A AB SER-ACNC: <0.2 AI (ref 0–0.9)
ENA SS-B AB SER-ACNC: <0.2 AI (ref 0–0.9)
RIBOSOMAL P AB SER-ACNC: <0.2 AI (ref 0–0.9)
SEE BELOW:, 164879: NORMAL

## 2022-05-19 NOTE — PROGRESS NOTES
Miguel Umana,  Results for screening for autoimmune or rheumatological conditions are also normal and negative for any underlying autoimmune conditions. Let me know if you have any questions, thanks.

## 2022-05-31 ENCOUNTER — HOSPITAL ENCOUNTER (OUTPATIENT)
Dept: MRI IMAGING | Age: 57
Discharge: HOME | End: 2022-05-31
Attending: PHYSICIAN ASSISTANT
Payer: COMMERCIAL

## 2022-05-31 VITALS — WEIGHT: 165 LBS | BODY MASS INDEX: 24.37 KG/M2

## 2022-05-31 DIAGNOSIS — M25.552 HIP PAIN, LEFT: ICD-10-CM

## 2022-05-31 PROCEDURE — 72197 MRI PELVIS W/O & W/DYE: CPT

## 2022-05-31 PROCEDURE — 74011250636 HC RX REV CODE- 250/636: Performed by: PHYSICIAN ASSISTANT

## 2022-05-31 PROCEDURE — A9575 INJ GADOTERATE MEGLUMI 0.1ML: HCPCS | Performed by: PHYSICIAN ASSISTANT

## 2022-05-31 RX ORDER — GADOTERATE MEGLUMINE 376.9 MG/ML
15 INJECTION INTRAVENOUS
Status: COMPLETED | OUTPATIENT
Start: 2022-05-31 | End: 2022-05-31

## 2022-05-31 RX ADMIN — GADOTERATE MEGLUMINE 15 ML: 376.9 INJECTION INTRAVENOUS at 16:14

## 2022-06-01 DIAGNOSIS — Z91.09 ENVIRONMENTAL ALLERGIES: ICD-10-CM

## 2022-06-01 DIAGNOSIS — M70.72 ILIOPSOAS BURSITIS OF LEFT HIP: ICD-10-CM

## 2022-06-01 DIAGNOSIS — R51.9 SINUS HEADACHE: ICD-10-CM

## 2022-06-01 DIAGNOSIS — M25.552 CHRONIC LEFT HIP PAIN: ICD-10-CM

## 2022-06-01 DIAGNOSIS — G89.29 CHRONIC LEFT HIP PAIN: ICD-10-CM

## 2022-06-01 DIAGNOSIS — R10.13 DYSPEPSIA: ICD-10-CM

## 2022-06-01 RX ORDER — AZELASTINE 1 MG/ML
SPRAY, METERED NASAL
Qty: 1 EACH | Refills: 1 | Status: SHIPPED | OUTPATIENT
Start: 2022-06-01 | End: 2022-08-22

## 2022-06-01 RX ORDER — ACETAMINOPHEN 500 MG
TABLET ORAL
Qty: 60 TABLET | Refills: 0 | Status: SHIPPED | OUTPATIENT
Start: 2022-06-01

## 2022-06-01 RX ORDER — FAMOTIDINE 40 MG/1
40 TABLET, FILM COATED ORAL
Qty: 15 TABLET | Refills: 0 | Status: SHIPPED | OUTPATIENT
Start: 2022-06-01 | End: 2022-06-28 | Stop reason: ALTCHOICE

## 2022-06-02 ENCOUNTER — TELEPHONE (OUTPATIENT)
Dept: ORTHOPEDIC SURGERY | Age: 57
End: 2022-06-02

## 2022-06-28 ENCOUNTER — OFFICE VISIT (OUTPATIENT)
Dept: FAMILY MEDICINE CLINIC | Age: 57
End: 2022-06-28
Payer: COMMERCIAL

## 2022-06-28 VITALS
WEIGHT: 156.2 LBS | OXYGEN SATURATION: 98 % | HEART RATE: 64 BPM | TEMPERATURE: 98 F | RESPIRATION RATE: 16 BRPM | DIASTOLIC BLOOD PRESSURE: 66 MMHG | BODY MASS INDEX: 23.13 KG/M2 | SYSTOLIC BLOOD PRESSURE: 102 MMHG | HEIGHT: 69 IN

## 2022-06-28 DIAGNOSIS — I10 ESSENTIAL HYPERTENSION: Primary | ICD-10-CM

## 2022-06-28 DIAGNOSIS — R73.03 PREDIABETES: ICD-10-CM

## 2022-06-28 DIAGNOSIS — M89.8X5 LYTIC BONE LESION OF HIP: ICD-10-CM

## 2022-06-28 DIAGNOSIS — I25.2 HISTORY OF ST ELEVATION MYOCARDIAL INFARCTION (STEMI): ICD-10-CM

## 2022-06-28 DIAGNOSIS — E78.2 MIXED HYPERLIPIDEMIA: ICD-10-CM

## 2022-06-28 PROCEDURE — 99213 OFFICE O/P EST LOW 20 MIN: CPT | Performed by: FAMILY MEDICINE

## 2022-06-28 RX ORDER — MAGNESIUM OXIDE 500 MG
1 TABLET ORAL
COMMUNITY
Start: 2022-05-16

## 2022-06-28 RX ORDER — ATORVASTATIN CALCIUM 20 MG/1
20 TABLET, FILM COATED ORAL DAILY
COMMUNITY
Start: 2022-05-28

## 2022-06-28 NOTE — PATIENT INSTRUCTIONS
High Blood Pressure: Care Instructions  Overview     It's normal for blood pressure to go up and down throughout the day. But if it stays up, you have high blood pressure. Another name for high blood pressure is hypertension. Despite what a lot of people think, high blood pressure usually doesn't cause headaches or make you feel dizzy or lightheaded. It usually has no symptoms. But it does increase your risk of stroke, heart attack, and other problems. You and your doctor will talk about your risks of these problems based on your blood pressure. Your doctor will give you a goal for your blood pressure. Your goal will be based on your health and your age. Lifestyle changes, such as eating healthy and being active, are always important to help lower blood pressure. You might also take medicine to reach your blood pressure goal.  Follow-up care is a key part of your treatment and safety. Be sure to make and go to all appointments, and call your doctor if you are having problems. It's also a good idea to know your test results and keep a list of the medicines you take. How can you care for yourself at home? Medical treatment  · If you stop taking your medicine, your blood pressure will go back up. You may take one or more types of medicine to lower your blood pressure. Be safe with medicines. Take your medicine exactly as prescribed. Call your doctor if you think you are having a problem with your medicine. · Talk to your doctor before you start taking aspirin every day. Aspirin can help certain people lower their risk of a heart attack or stroke. But taking aspirin isn't right for everyone, because it can cause serious bleeding. · See your doctor regularly. You may need to see the doctor more often at first or until your blood pressure comes down. · If you are taking blood pressure medicine, talk to your doctor before you take decongestants or anti-inflammatory medicine, such as ibuprofen.  Some of these medicines can raise blood pressure. · Learn how to check your blood pressure at home. Lifestyle changes  · Stay at a healthy weight. This is especially important if you put on weight around the waist. Losing even 10 pounds can help you lower your blood pressure. · If your doctor recommends it, get more exercise. Walking is a good choice. Bit by bit, increase the amount you walk every day. Try for at least 30 minutes on most days of the week. You also may want to swim, bike, or do other activities. · Avoid or limit alcohol. Talk to your doctor about whether you can drink any alcohol. · Try to limit how much sodium you eat to less than 2,300 milligrams (mg) a day. Your doctor may ask you to try to eat less than 1,500 mg a day. · Eat plenty of fruits (such as bananas and oranges), vegetables, legumes, whole grains, and low-fat dairy products. · Lower the amount of saturated fat in your diet. Saturated fat is found in animal products such as milk, cheese, and meat. Limiting these foods may help you lose weight and also lower your risk for heart disease. · Do not smoke. Smoking increases your risk for heart attack and stroke. If you need help quitting, talk to your doctor about stop-smoking programs and medicines. These can increase your chances of quitting for good. When should you call for help? Call 911  anytime you think you may need emergency care. This may mean having symptoms that suggest that your blood pressure is causing a serious heart or blood vessel problem. Your blood pressure may be over 180/120. For example, call 911 if:    · You have symptoms of a heart attack. These may include:  ? Chest pain or pressure, or a strange feeling in the chest.  ? Sweating. ? Shortness of breath. ? Nausea or vomiting. ? Pain, pressure, or a strange feeling in the back, neck, jaw, or upper belly or in one or both shoulders or arms. ? Lightheadedness or sudden weakness.   ? A fast or irregular heartbeat.     · You have symptoms of a stroke. These may include:  ? Sudden numbness, tingling, weakness, or loss of movement in your face, arm, or leg, especially on only one side of your body. ? Sudden vision changes. ? Sudden trouble speaking. ? Sudden confusion or trouble understanding simple statements. ? Sudden problems with walking or balance. ? A sudden, severe headache that is different from past headaches.     · You have severe back or belly pain. Do not wait until your blood pressure comes down on its own. Get help right away. Call your doctor now or seek immediate care if:    · Your blood pressure is much higher than normal (such as 180/120 or higher), but you don't have symptoms.     · You think high blood pressure is causing symptoms, such as:  ? Severe headache.  ? Blurry vision. Watch closely for changes in your health, and be sure to contact your doctor if:    · Your blood pressure measures higher than your doctor recommends at least 2 times. That means the top number is higher or the bottom number is higher, or both.     · You think you may be having side effects from your blood pressure medicine. Where can you learn more? Go to http://www.gray.com/  Enter [id number] in the search box to learn more about \"High Blood Pressure: Care Instructions. \"  Current as of: January 10, 2022               Content Version: 13.2  © 2006-2022 [employer]. Care instructions adapted under license by [employer] (which disclaims liability or warranty for this information). If you have questions about a medical condition or this instruction, always ask your healthcare professional. [place] [address] any warranty or liability for your use of this information.

## 2022-06-28 NOTE — PROGRESS NOTES
HISTORY OF PRESENT ILLNESS  Anju Cortez is a 62 y.o. male. Patient was seen today for follow-up on CAD, dyslipidemia, hypertension and prediabetes. He also wants to discuss his MRI and PET scan result for left hip lesion. HPI  Cardiovascular Review  The patient has hypertension, hyperlipidemia, coronary artery disease and history of prior MI, s/p stent, and prediabetes.  He reports taking medications as instructed, no medication side effects noted, no chest pain on exertion, no dyspnea on exertion, no swelling of ankles, no orthostatic dizziness or lightheadedness, no orthopnea or paroxysmal nocturnal dyspnea, no palpitations.  Diet and Lifestyle: generally follows a low fat low cholesterol diet, generally follows a low sodium diet, exercises regularly, nonsmoker.  Lab review: no lab studies available for review at time of visit, patient had blood work with cardiology office, records requested.    Medicines: Carvedilol 3.125 mg twice daily, atorvastatin 20 mg, Zetia 10 mg. He is off Brilinta 90 mg now and only taking aspirin 81 mg.  He is taking his valsartan only as needed if blood pressure readings are high. .    Patient was evaluated in April for left-sided hip pain. He had x-ray of left hip that showed lytic lesion of almost 6 cm size in superior left acetabulum. He was referred to orthopedic specialist and had MRI of left hip as well as PET scan. His scan did show increase stress or upset and left acetabulum as well as MRI confirmed lytic bone lesion left superior acetabulum. He was referred to [facility] orthopedic specialist and is scheduled for CT-guided bone biopsy. Review of Systems   Constitutional: Negative for chills, fever and malaise/fatigue. HENT: Negative for congestion, ear pain, sore throat and tinnitus. Eyes: Negative for blurred vision, double vision, pain and discharge. Respiratory: Negative for cough, shortness of breath and wheezing.     Cardiovascular: Negative for chest pain, palpitations and leg swelling. Gastrointestinal: Negative for abdominal pain, blood in stool, constipation, diarrhea, nausea and vomiting. Genitourinary: Negative for dysuria, frequency, hematuria and urgency. Musculoskeletal: Negative for back pain, joint pain and myalgias. Skin: Negative for rash. Neurological: Negative for dizziness, tremors, seizures and headaches. Endo/Heme/Allergies: Negative for polydipsia. Does not bruise/bleed easily. Psychiatric/Behavioral: Negative for depression and substance abuse. The patient is not nervous/anxious. Physical Exam  Vitals and nursing note reviewed. Constitutional:       Appearance: He is well-developed. He is not diaphoretic. HENT:      Head: Normocephalic and atraumatic. Right Ear: External ear normal.      Mouth/Throat:      Pharynx: No oropharyngeal exudate. Eyes:      General: No scleral icterus. Conjunctiva/sclera: Conjunctivae normal.      Pupils: Pupils are equal, round, and reactive to light. Neck:      Thyroid: No thyromegaly. Vascular: No JVD. Cardiovascular:      Rate and Rhythm: Normal rate and regular rhythm. Heart sounds: Normal heart sounds. No murmur heard. Pulmonary:      Effort: Pulmonary effort is normal.      Breath sounds: Normal breath sounds. No wheezing. Abdominal:      General: Bowel sounds are normal. There is no distension. Palpations: Abdomen is soft. There is no mass. Musculoskeletal:         General: No tenderness. Normal range of motion. Cervical back: Normal range of motion and neck supple. Lymphadenopathy:      Cervical: No cervical adenopathy. Skin:     General: Skin is warm and dry. Findings: No rash. Neurological:      Mental Status: He is alert and oriented to person, place, and time. Cranial Nerves: No cranial nerve deficit. Deep Tendon Reflexes: Reflexes are normal and symmetric.  Reflexes normal.         ASSESSMENT and PLAN  Diagnoses and all orders for this visit:    1. Essential hypertension  -     METABOLIC PANEL, COMPREHENSIVE; Future    2. Mixed hyperlipidemia  -     LIPID PANEL; Future  -     METABOLIC PANEL, COMPREHENSIVE; Future    3. Lytic bone lesion of hip    4. History of ST elevation myocardial infarction (STEMI)    5. Prediabetes  -     METABOLIC PANEL, COMPREHENSIVE; Future  -     HEMOGLOBIN A1C WITH EAG; Future    Discussed lifestyle issues and health guidance given  Patient was given an after visit summary which includes diagnoses, vital signs, current medications, instructions and references & authorized prescriptions . Results of labs will be conveyed to patient, once available. Pt verbalized instructions I provided and expressed understanding of discussion that was held today. Please note that this dictation was completed with [employer], the computer voice recognition software. Quite often unanticipated grammatical, syntax, homophones, and other interpretive errors are inadvertently transcribed by the computer software. Please disregard these errors. Please excuse any errors that have escaped final proofreading. Thank you.

## 2022-06-28 NOTE — PROGRESS NOTES
Chief Complaint   Patient presents with    Hypertension     follow up    Cholesterol Problem     follow up         1. \"Have you been to the ER, urgent care clinic since your last visit? Hospitalized since your last visit? \" No    2. \"Have you seen or consulted any other health care providers outside of the [address] [place] [address] since your last visit? \" Yes When: 6/17/22 Where: [facility] Reason for visit: biopsy     3. For patients aged 39-70: Has the patient had a colonoscopy / FIT/ Cologuard? Yes - no Care Gap present      If the patient is female:    4. For patients aged 41-77: Has the patient had a mammogram within the past 2 years? NA - based on age or sex      11. For patients aged 21-65: Has the patient had a pap smear?  NA - based on age or sex         3 most recent PHQ Screens 6/28/2022   Little interest or pleasure in doing things Not at all   Feeling down, depressed, irritable, or hopeless Not at all   Total Score PHQ 2 0       Health Maintenance Due   Topic Date Due    DTaP/Tdap/Td series (1 - Tdap) Never done    Shingrix Vaccine Age 50> (1 of 2) Never done

## 2022-06-29 LAB
ALBUMIN SERPL-MCNC: 3.9 G/DL (ref 3.5–5)
ALBUMIN/GLOB SERPL: 1.1 {RATIO} (ref 1.1–2.2)
ALP SERPL-CCNC: 85 U/L (ref 45–117)
ALT SERPL-CCNC: 23 U/L (ref 12–78)
ANION GAP SERPL CALC-SCNC: 5 MMOL/L (ref 5–15)
AST SERPL-CCNC: 17 U/L (ref 15–37)
BILIRUB SERPL-MCNC: 0.6 MG/DL (ref 0.2–1)
BUN SERPL-MCNC: 19 MG/DL (ref 6–20)
BUN/CREAT SERPL: 17 (ref 12–20)
CALCIUM SERPL-MCNC: 8.8 MG/DL (ref 8.5–10.1)
CHLORIDE SERPL-SCNC: 107 MMOL/L (ref 97–108)
CHOLEST SERPL-MCNC: 83 MG/DL
CO2 SERPL-SCNC: 28 MMOL/L (ref 21–32)
CREAT SERPL-MCNC: 1.1 MG/DL (ref 0.7–1.3)
EST. AVERAGE GLUCOSE BLD GHB EST-MCNC: 117 MG/DL
GLOBULIN SER CALC-MCNC: 3.6 G/DL (ref 2–4)
GLUCOSE SERPL-MCNC: 73 MG/DL (ref 65–100)
HBA1C MFR BLD: 5.7 % (ref 4–5.6)
HDLC SERPL-MCNC: 34 MG/DL
HDLC SERPL: 2.4 {RATIO} (ref 0–5)
LDLC SERPL CALC-MCNC: 37.8 MG/DL (ref 0–100)
POTASSIUM SERPL-SCNC: 4.2 MMOL/L (ref 3.5–5.1)
PROT SERPL-MCNC: 7.5 G/DL (ref 6.4–8.2)
SODIUM SERPL-SCNC: 140 MMOL/L (ref 136–145)
TRIGL SERPL-MCNC: 56 MG/DL (ref ?–150)
VLDLC SERPL CALC-MCNC: 11.2 MG/DL

## 2022-06-29 NOTE — PROGRESS NOTES
Hello Mr. Mcintyre,  I have reviewed your results. Fasting sugar and average sugar both are in very normal and reassuring range. Kidney liver and cholesterol results are also very normal.  Overall all results are at goal.Please let me know if you have any questions, thanks.

## 2022-08-22 DIAGNOSIS — Z91.09 ENVIRONMENTAL ALLERGIES: ICD-10-CM

## 2022-08-22 DIAGNOSIS — R51.9 SINUS HEADACHE: ICD-10-CM

## 2022-08-22 RX ORDER — AZELASTINE 1 MG/ML
SPRAY, METERED NASAL
Qty: 1 EACH | Refills: 1 | Status: SHIPPED | OUTPATIENT
Start: 2022-08-22

## 2022-12-24 DIAGNOSIS — R51.9 SINUS HEADACHE: ICD-10-CM

## 2022-12-24 DIAGNOSIS — Z91.09 ENVIRONMENTAL ALLERGIES: ICD-10-CM

## 2022-12-27 RX ORDER — MINERAL OIL
ENEMA (ML) RECTAL
Qty: 30 TABLET | Refills: 2 | Status: SHIPPED | OUTPATIENT
Start: 2022-12-27

## 2023-03-14 ENCOUNTER — OFFICE VISIT (OUTPATIENT)
Dept: FAMILY MEDICINE CLINIC | Age: 58
End: 2023-03-14
Payer: COMMERCIAL

## 2023-03-14 VITALS
SYSTOLIC BLOOD PRESSURE: 131 MMHG | HEIGHT: 69 IN | RESPIRATION RATE: 16 BRPM | OXYGEN SATURATION: 100 % | WEIGHT: 166.2 LBS | BODY MASS INDEX: 24.62 KG/M2 | TEMPERATURE: 97.5 F | DIASTOLIC BLOOD PRESSURE: 80 MMHG | HEART RATE: 66 BPM

## 2023-03-14 DIAGNOSIS — J06.9 VIRAL URI WITH COUGH: ICD-10-CM

## 2023-03-14 DIAGNOSIS — R73.03 PREDIABETES: ICD-10-CM

## 2023-03-14 DIAGNOSIS — I10 ESSENTIAL HYPERTENSION: Primary | ICD-10-CM

## 2023-03-14 DIAGNOSIS — E78.2 MIXED HYPERLIPIDEMIA: ICD-10-CM

## 2023-03-14 DIAGNOSIS — R04.2 HEMOPTYSIS: ICD-10-CM

## 2023-03-14 DIAGNOSIS — A09 DIARRHEA, TRAVELERS': ICD-10-CM

## 2023-03-14 DIAGNOSIS — I25.2 HISTORY OF ST ELEVATION MYOCARDIAL INFARCTION (STEMI): ICD-10-CM

## 2023-03-14 PROBLEM — E78.5 DYSLIPIDEMIA: Status: ACTIVE | Noted: 2022-06-17

## 2023-03-14 PROCEDURE — 3075F SYST BP GE 130 - 139MM HG: CPT | Performed by: FAMILY MEDICINE

## 2023-03-14 PROCEDURE — 3079F DIAST BP 80-89 MM HG: CPT | Performed by: FAMILY MEDICINE

## 2023-03-14 PROCEDURE — 99214 OFFICE O/P EST MOD 30 MIN: CPT | Performed by: FAMILY MEDICINE

## 2023-03-14 RX ORDER — METRONIDAZOLE 500 MG/1
500 TABLET ORAL 3 TIMES DAILY
Qty: 30 TABLET | Refills: 0 | Status: SHIPPED | OUTPATIENT
Start: 2023-03-14 | End: 2023-03-24

## 2023-03-14 RX ORDER — NITROGLYCERIN 0.4 MG/1
TABLET SUBLINGUAL
COMMUNITY
Start: 2022-12-27

## 2023-03-14 RX ORDER — BENZONATATE 200 MG/1
200 CAPSULE ORAL
Qty: 20 CAPSULE | Refills: 0 | Status: SHIPPED | OUTPATIENT
Start: 2023-03-14 | End: 2023-03-21

## 2023-03-14 NOTE — PROGRESS NOTES
Chief Complaint   Patient presents with    Cough     Pt had cough and cold, last month in [place]. Pt was getting  better but now again pt sometimes cough, with little blood day before yesterday    Abdominal Pain     It was also one of the symptoms that pt had back in [place]. Pt was given medications. Pt uses the bathroom frequently. Fluffy poop. 1. \"Have you been to the ER, urgent care clinic since your last visit? Hospitalized since your last visit? \" Yes When: 2/28/23 Where: urgent care Reason for visit: cough and cold    2. \"Have you seen or consulted any other health care providers outside of the [address] [place] [address] since your last visit? \" No     3. For patients aged 39-70: Has the patient had a colonoscopy / FIT/ Cologuard? Yes - no Care Gap present      If the patient is female:    4. For patients aged 41-77: Has the patient had a mammogram within the past 2 years? NA - based on age or sex      11. For patients aged 21-65: Has the patient had a pap smear?  NA - based on age or sex       Financial Resource Strain: Not on file      Food Insecurity: Not on file        3 most recent PHQ Screens 3/14/2023   Little interest or pleasure in doing things Not at all   Feeling down, depressed, irritable, or hopeless Not at all   Total Score PHQ 2 0       Health Maintenance Due   Topic Date Due    DTaP/Tdap/Td series (1 - Tdap) Never done    Shingles Vaccine (1 of 2) Never done    COVID-19 Vaccine (4 - Booster for Pfizer series) 03/16/2022    Flu Vaccine (1) 08/01/2022

## 2023-03-14 NOTE — PROGRESS NOTES
HISTORY OF PRESENT ILLNESS  Shira Rodrigues is a 62 y.o. male. Patient was seen today for concern of cough as well as diarrhea that he started after his travel from [place] . His past medical history is significant for hypertension, dyslipidemia, history of myocardial infarction. He is also here for follow-up on his chronic medical conditions and blood work. HPI  Cardiovascular Review  The patient has hypertension, hyperlipidemia, coronary artery disease, and history of prior MI. He reports taking medications as instructed, no medication side effects noted, no chest pain on exertion, no dyspnea on exertion, no swelling of ankles, no orthostatic dizziness or lightheadedness, no orthopnea or paroxysmal nocturnal dyspnea, no palpitations, no intermittent claudication symptoms. Diet and Lifestyle: generally follows a low fat low cholesterol diet, generally follows a low sodium diet, follows a diabetic diet regularly, exercises regularly, nonsmoker. Lab review: no lab studies available for review at time of visit. Medicines: Atorvastatin 20 mg, aspirin 81 mg, ezetimibe 10 mg, carvedilol 3.125 mg, Diovan 40 mg. He is off Brilinta now  URI Review  Vernal Nip returns to clinic today to talk about: URI symptoms, sinus congestion, post nasal drip, dry cough, and chest congestion that started gradual and 2 weeks ago, and is gradually improving since that time. He reports his symptoms started since he was in [place] and he was treated with antibiotic. Symptoms were better for few days but after his flight to [facility], again started with significant sinus congestion, sinus pressure, postnasal drainage and dry cough. .  He also had tinge of blood with coughing for last 2 days. He denies a history of: fever. Treatments have included: cough suppressants, nasal steroids, he was prescribed antibiotic by urgent care but he did not start antibiotic. Zoë Carranza Relevant PMH: recurrent sinusitis and allergic rhinitis.   Patient reports sick contacts: no.    Diarrhea  Patient complains of diarrhea. Onset of diarrhea was several weeks ago. Diarrhea is occurring approximately a few times per day. Patient describes diarrhea as semisolid and mushy . Diarrhea has been associated with abdominal pain cramping and recent travel to [place] . Patient denies fever, blood in stool, recent antibiotic use, illness in household contacts, unintentional weight loss. Previous visits for diarrhea: none. Evaluation to date: none. Treatment to date: None    Review of Systems   Constitutional:  Negative for chills, fever and malaise/fatigue. HENT:  Negative for congestion, ear pain, sore throat and tinnitus. Eyes:  Negative for blurred vision, double vision, pain and discharge. Respiratory:  Positive for cough and hemoptysis. Negative for shortness of breath and wheezing. Cardiovascular:  Negative for chest pain, palpitations and leg swelling. Gastrointestinal:  Positive for diarrhea. Negative for abdominal pain, blood in stool, constipation, nausea and vomiting. Genitourinary:  Negative for dysuria, frequency, hematuria and urgency. Musculoskeletal:  Negative for back pain, joint pain and myalgias. Skin:  Negative for rash. Neurological:  Negative for dizziness, tremors, seizures and headaches. Endo/Heme/Allergies:  Negative for polydipsia. Does not bruise/bleed easily. Psychiatric/Behavioral:  Negative for depression and substance abuse. The patient is not nervous/anxious. Physical Exam  Vitals and nursing note reviewed. Constitutional:       General: He is not in acute distress. Appearance: He is well-developed. He is not diaphoretic. HENT:      Head: Normocephalic and atraumatic. Right Ear: Tympanic membrane and external ear normal. No decreased hearing noted. No drainage. No middle ear effusion. Tympanic membrane is not injected. Left Ear: Tympanic membrane normal. No decreased hearing noted. No drainage.   No middle ear effusion. Tympanic membrane is not injected. Nose: Mucosal edema and congestion present. No rhinorrhea. Right Sinus: Maxillary sinus tenderness and frontal sinus tenderness present. Left Sinus: Maxillary sinus tenderness and frontal sinus tenderness present. Mouth/Throat:      Mouth: Mucous membranes are moist.      Pharynx: Uvula midline. Posterior oropharyngeal erythema present. No oropharyngeal exudate. Eyes:      General: No scleral icterus. Conjunctiva/sclera: Conjunctivae normal.      Pupils: Pupils are equal, round, and reactive to light. Neck:      Thyroid: No thyromegaly. Vascular: No JVD. Cardiovascular:      Rate and Rhythm: Normal rate and regular rhythm. Heart sounds: Normal heart sounds. No murmur heard. Pulmonary:      Effort: Pulmonary effort is normal.      Breath sounds: Normal breath sounds. No wheezing or rales. Abdominal:      General: Bowel sounds are normal. There is no distension. Palpations: Abdomen is soft. There is no mass. Tenderness: There is abdominal tenderness in the left lower quadrant. Musculoskeletal:         General: No tenderness. Normal range of motion. Cervical back: Normal range of motion and neck supple. Lymphadenopathy:      Head:      Right side of head: No tonsillar adenopathy. Left side of head: No tonsillar adenopathy. Cervical: No cervical adenopathy. Skin:     General: Skin is warm and dry. Findings: No rash. Neurological:      Mental Status: He is alert and oriented to person, place, and time. Cranial Nerves: No cranial nerve deficit. Deep Tendon Reflexes: Reflexes are normal and symmetric. Reflexes normal.   Psychiatric:         Mood and Affect: Mood normal.         Behavior: Behavior normal.       ASSESSMENT and PLAN  Diagnoses and all orders for this visit:    1. Essential hypertension  -     METABOLIC PANEL, COMPREHENSIVE; Future    2.  Mixed hyperlipidemia  -     LIPID PANEL; Future  -     METABOLIC PANEL, COMPREHENSIVE; Future    3. Diarrhea, travelers'  -     metroNIDAZOLE (FLAGYL) 500 mg tablet; Take 1 Tablet by mouth three (3) times daily for 10 days.  -     OVA & PARASITES, STOOL; Future  -     WBC, STOOL; Future    4. Viral URI with cough  -     benzonatate (TESSALON) 200 mg capsule; Take 1 Capsule by mouth three (3) times daily as needed for Cough for up to 7 days. 5. Hemoptysis  -     XR CHEST PA LAT; Future    6. History of ST elevation myocardial infarction (STEMI)  -     LIPID PANEL; Future    7. Prediabetes  -     METABOLIC PANEL, COMPREHENSIVE; Future  -     HEMOGLOBIN A1C WITH EAG; Future    We will check patient for anemia cyst and giardiasis. Started on empirical metronidazole. Will do x-ray chest due to his comorbid conditions as well as history of hemoptysis. X-ray chest was negative for any acute findings in office today. We will do his routine labs. Discussed lifestyle issues and health guidance given  Patient was given an after visit summary which includes diagnoses, vital signs, current medications, instructions and references & authorized prescriptions . Results of labs will be conveyed to patient, once available. Pt verbalized instructions I provided and expressed understanding of discussion that was held today. Please note that this dictation was completed with [employer], the computer voice recognition software. Quite often unanticipated grammatical, syntax, homophones, and other interpretive errors are inadvertently transcribed by the computer software. Please disregard these errors. Please excuse any errors that have escaped final proofreading. Thank you.

## 2023-03-15 LAB
ALBUMIN SERPL-MCNC: 4 G/DL (ref 3.5–5)
ALBUMIN/GLOB SERPL: 1 (ref 1.1–2.2)
ALP SERPL-CCNC: 89 U/L (ref 45–117)
ALT SERPL-CCNC: 41 U/L (ref 12–78)
ANION GAP SERPL CALC-SCNC: 2 MMOL/L (ref 5–15)
AST SERPL-CCNC: 27 U/L (ref 15–37)
BILIRUB SERPL-MCNC: 0.8 MG/DL (ref 0.2–1)
BUN SERPL-MCNC: 12 MG/DL (ref 6–20)
BUN/CREAT SERPL: 12 (ref 12–20)
CALCIUM SERPL-MCNC: 9.2 MG/DL (ref 8.5–10.1)
CHLORIDE SERPL-SCNC: 106 MMOL/L (ref 97–108)
CHOLEST SERPL-MCNC: 99 MG/DL
CO2 SERPL-SCNC: 29 MMOL/L (ref 21–32)
CREAT SERPL-MCNC: 1.03 MG/DL (ref 0.7–1.3)
EST. AVERAGE GLUCOSE BLD GHB EST-MCNC: 126 MG/DL
GLOBULIN SER CALC-MCNC: 4 G/DL (ref 2–4)
GLUCOSE SERPL-MCNC: 102 MG/DL (ref 65–100)
HBA1C MFR BLD: 6 % (ref 4–5.6)
HDLC SERPL-MCNC: 34 MG/DL
HDLC SERPL: 2.9 (ref 0–5)
LDLC SERPL CALC-MCNC: 43 MG/DL (ref 0–100)
POTASSIUM SERPL-SCNC: 4.3 MMOL/L (ref 3.5–5.1)
PROT SERPL-MCNC: 8 G/DL (ref 6.4–8.2)
SODIUM SERPL-SCNC: 137 MMOL/L (ref 136–145)
TRIGL SERPL-MCNC: 110 MG/DL (ref ?–150)
VLDLC SERPL CALC-MCNC: 22 MG/DL

## 2023-03-17 NOTE — PROGRESS NOTES
Miguel Mr Rosio Faith,  Fasting sugar and average sugar both are in prediabetic range. Please watch diet strictly for carbohydrates, rice and sweets. Carlos Juwan and cholesterol results are very normal.  Please complete your stool study. Let me know if you have any questions.   thanks

## 2023-03-24 DIAGNOSIS — R10.13 DYSPEPSIA: ICD-10-CM

## 2023-03-25 RX ORDER — FAMOTIDINE 40 MG/1
40 TABLET, FILM COATED ORAL
Qty: 15 TABLET | Refills: 0 | Status: SHIPPED | OUTPATIENT
Start: 2023-03-25

## 2023-03-28 RX ORDER — ATORVASTATIN CALCIUM 20 MG/1
20 TABLET, FILM COATED ORAL DAILY
OUTPATIENT
Start: 2023-03-28

## 2023-03-28 RX ORDER — CARVEDILOL 3.12 MG/1
3.12 TABLET ORAL 2 TIMES DAILY WITH MEALS
OUTPATIENT
Start: 2023-03-28

## 2023-03-28 NOTE — TELEPHONE ENCOUNTER
PCP: Adri Man MD    Last appt: 3/14/2023  No future appointments. Requested Prescriptions     Pending Prescriptions Disp Refills    carvediloL (COREG) 3.125 mg tablet       Sig: Take 1 Tablet by mouth two (2) times daily (with meals). atorvastatin (LIPITOR) 20 mg tablet       Sig: Take 1 Tablet by mouth daily.        Prior labs and Blood pressures:  BP Readings from Last 3 Encounters:   03/14/23 131/80   06/28/22 102/66   05/17/22 107/72     Lab Results   Component Value Date/Time    Sodium 137 03/14/2023 11:53 AM    Potassium 4.3 03/14/2023 11:53 AM    Chloride 106 03/14/2023 11:53 AM    CO2 29 03/14/2023 11:53 AM    Anion gap 2 (L) 03/14/2023 11:53 AM    Glucose 102 (H) 03/14/2023 11:53 AM    BUN 12 03/14/2023 11:53 AM    Creatinine 1.03 03/14/2023 11:53 AM    BUN/Creatinine ratio 12 03/14/2023 11:53 AM    GFR est AA >60 06/28/2022 02:31 PM    GFR est non-AA >60 06/28/2022 02:31 PM    Calcium 9.2 03/14/2023 11:53 AM     Lab Results   Component Value Date/Time    Hemoglobin A1c 6.0 (H) 03/14/2023 11:53 AM     Lab Results   Component Value Date/Time    Cholesterol, total 99 03/14/2023 11:53 AM    HDL Cholesterol 34 03/14/2023 11:53 AM    LDL, calculated 43 03/14/2023 11:53 AM    VLDL, calculated 22 03/14/2023 11:53 AM    Triglyceride 110 03/14/2023 11:53 AM    CHOL/HDL Ratio 2.9 03/14/2023 11:53 AM     No results found for: GIOVANY Miramontes    Lab Results   Component Value Date/Time    TSH 1.46 03/31/2022 09:39 AM

## 2023-04-23 DIAGNOSIS — L21.9 SEBORRHEIC DERMATITIS OF SCALP: ICD-10-CM

## 2023-04-23 RX ORDER — KETOCONAZOLE 20 MG/ML
SHAMPOO, SUSPENSION TOPICAL
Qty: 120 ML | Refills: 1 | Status: SHIPPED | OUTPATIENT
Start: 2023-04-23

## 2023-05-16 RX ORDER — FAMOTIDINE 40 MG/1
1 TABLET, FILM COATED ORAL NIGHTLY
COMMUNITY
Start: 2023-03-25

## 2023-05-16 RX ORDER — FEXOFENADINE HCL 180 MG/1
1 TABLET ORAL DAILY
COMMUNITY
Start: 2022-12-27

## 2023-05-16 RX ORDER — CARVEDILOL 3.12 MG/1
3.12 TABLET ORAL 2 TIMES DAILY WITH MEALS
COMMUNITY
Start: 2022-01-27

## 2023-05-16 RX ORDER — NITROGLYCERIN 0.4 MG/1
TABLET SUBLINGUAL
COMMUNITY
Start: 2022-12-27

## 2023-05-16 RX ORDER — ATORVASTATIN CALCIUM 20 MG/1
20 TABLET, FILM COATED ORAL DAILY
COMMUNITY
Start: 2022-05-28

## 2023-05-16 RX ORDER — ASPIRIN 81 MG/1
1 TABLET ORAL DAILY
COMMUNITY
Start: 2021-03-27

## 2023-05-16 RX ORDER — VALSARTAN 40 MG/1
TABLET ORAL DAILY
COMMUNITY

## 2023-05-16 RX ORDER — KETOCONAZOLE 20 MG/ML
SHAMPOO TOPICAL
COMMUNITY
Start: 2023-04-23

## 2023-05-16 RX ORDER — EZETIMIBE 10 MG/1
TABLET ORAL
COMMUNITY
Start: 2022-04-04

## 2023-05-19 ENCOUNTER — TELEPHONE (OUTPATIENT)
Age: 58
End: 2023-05-19

## 2023-05-19 NOTE — TELEPHONE ENCOUNTER
Healthy persons ? 3years of age who received a single dose of MenACWY (eg, for travel) and subsequently develop a condition that requires a two-dose primary series (eg, asplenia, HIV, complement component deficiency) should receive a second dose to complete the primary series as soon as possible, provided that the two doses are ?8 weeks apart . He does not have any of those conditions but if he is travelling to country where meningitis risk is high and if it has been 5 years from last vaccination, recommend second dose.   Thanks

## 2023-05-19 NOTE — TELEPHONE ENCOUNTER
According to immunization record patient took meningococcal on 2018, not sure if patient should take it again

## 2023-05-19 NOTE — TELEPHONE ENCOUNTER
Patient called stated 2 years ago he got menningitis  Wants to know if he could get some traveling vaccine.     Requesting a call back    Best call back #[phone]

## 2023-05-22 ENCOUNTER — TELEPHONE (OUTPATIENT)
Age: 58
End: 2023-05-22

## 2023-07-15 DIAGNOSIS — M70.72 OTHER BURSITIS OF HIP, LEFT HIP: ICD-10-CM

## 2023-07-15 DIAGNOSIS — M25.552 PAIN IN LEFT HIP: ICD-10-CM

## 2023-07-16 RX ORDER — ACETAMINOPHEN 500 MG/1
TABLET ORAL
Qty: 100 TABLET | Refills: 0 | Status: SHIPPED | OUTPATIENT
Start: 2023-07-16

## 2023-07-19 DIAGNOSIS — M25.552 PAIN IN LEFT HIP: ICD-10-CM

## 2023-07-19 DIAGNOSIS — M70.72 OTHER BURSITIS OF HIP, LEFT HIP: ICD-10-CM

## 2023-07-19 RX ORDER — ACETAMINOPHEN 500 MG/1
TABLET ORAL
Qty: 100 TABLET | Refills: 0 | OUTPATIENT
Start: 2023-07-19

## 2023-08-06 ENCOUNTER — TELEPHONE (OUTPATIENT)
Age: 58
End: 2023-08-06

## 2023-08-06 DIAGNOSIS — U07.1 COVID: Primary | ICD-10-CM

## 2023-08-06 DIAGNOSIS — I25.2 HISTORY OF ST ELEVATION MYOCARDIAL INFARCTION (STEMI): ICD-10-CM

## 2023-08-16 ENCOUNTER — TELEPHONE (OUTPATIENT)
Age: 58
End: 2023-08-16

## 2023-08-16 ENCOUNTER — NURSE TRIAGE (OUTPATIENT)
Dept: OTHER | Facility: CLINIC | Age: 58
End: 2023-08-16

## 2023-08-16 ENCOUNTER — TELEMEDICINE (OUTPATIENT)
Age: 58
End: 2023-08-16
Payer: COMMERCIAL

## 2023-08-16 DIAGNOSIS — G93.31 POSTVIRAL SYNDROME: Primary | ICD-10-CM

## 2023-08-16 DIAGNOSIS — R42 DIZZINESS: ICD-10-CM

## 2023-08-16 PROCEDURE — 99214 OFFICE O/P EST MOD 30 MIN: CPT | Performed by: STUDENT IN AN ORGANIZED HEALTH CARE EDUCATION/TRAINING PROGRAM

## 2023-08-16 RX ORDER — FLUTICASONE PROPIONATE 50 MCG
1 SPRAY, SUSPENSION (ML) NASAL DAILY
Qty: 16 G | Refills: 0 | Status: SHIPPED | OUTPATIENT
Start: 2023-08-16

## 2023-08-16 NOTE — PROGRESS NOTES
and recently diagnosed with COVID with persistent dizziness after viral infeciton. Unable to perform appropriate exam today 2/2 VV interface however suspect postviral congestio so will trial nasal steroid for symptoms and have him come into office for BP check within the week. Also advised close followup with cardiology for monitoring given CV history. 1. Postviral syndrome  - fluticasone (FLONASE) 50 MCG/ACT nasal spray; 1 spray by Each Nostril route daily  Dispense: 16 g; Refill: 0    2. Dizziness: with hx of MI, HTN, concern for uncontrolled HTN. No acute symptoms today will have him come to office for BP check       We discussed the expected course, resolution and complications of the diagnosis(es) in detail. Medication risks, benefits, costs, interactions, and alternatives were discussed as indicated. I advised him to contact the office if his condition worsens, changes or fails to improve as anticipated. He expressed understanding with the diagnosis(es) and plan. Patient understands that this encounter was a temporary measure, and the importance of further follow up and examination was emphasized. Patient verbalized understanding. Patient informed to follow up:   Return in about 2 weeks (around 8/30/2023) for routine care with your PCP, blood pressure. Starr Macedo, was evaluated through a synchronous (real-time) audio-video encounter. The patient (or guardian if applicable) is aware that this is a billable service, which includes applicable co-pays. This Virtual Visit was conducted with patient's (and/or legal guardian's) consent. Patient identification was verified, and a caregiver was present when appropriate.    The patient was located at Home: [address] [place] [id number]  Provider was located at [facility] (Appt Dept): [address] [place] [address],  [address] [place]        Total time spent for this encounter: Not billed by time    --Kenyon Smallwood MD on 8/16/2023 at 4:12 PM    An

## 2023-08-16 NOTE — TELEPHONE ENCOUNTER
----- Message from Miriam Sorensen sent at 8/16/2023 10:01 AM EDT -----  Subject: Message to Provider    QUESTIONS  Information for Provider? pt called in and stated last week he got covid. Dr Gavi Calvin prescribed medication but is still symptomatic. Please contact   the pt  ---------------------------------------------------------------------------  --------------  Valentino Standing INFO  [phone]; OK to leave message on voicemail  ---------------------------------------------------------------------------  --------------  SCRIPT ANSWERS  Relationship to Patient?  Self
no

## 2023-08-16 NOTE — TELEPHONE ENCOUNTER
Location of patient: [address] [facility] [place] call from [facility] at [facility] with [employer]. Subjective: Caller states \"coughing and dizziness. \"     Current Symptoms: fatigue    Some coughing in the AM.    Sweating    Dizziness at times-none today    Some SOB at times. Covid + on August 5th. Onset: 2 weeks ago; waxing and waning    Associated Symptoms: increased wakefulness    Pain Severity: 0/10; N/A; none    Temperature: none     What has been tried: Paxlovid    LMP: NA Pregnant: NA    Recommended disposition: See in Office Today    Care advice provided, patient verbalizes understanding; denies any other questions or concerns; instructed to call back for any new or worsening symptoms. Patient/Caller agrees with recommended disposition; writer provided warm transfer to [address] [place] [address] at [facility] for appointment scheduling    Attention Provider: Thank you for allowing me to participate in the care of your patient. The patient was connected to triage in response to information provided to the ECC/PSC. Please do not respond through this encounter as the response is not directed to a shared pool.       Reason for Disposition   Patient wants to be seen    Protocols used: Dizziness-ADULT-[place]

## 2023-08-17 ENCOUNTER — TELEPHONE (OUTPATIENT)
Age: 58
End: 2023-08-17

## 2023-08-17 NOTE — TELEPHONE ENCOUNTER
Spoke to pt and inform him per Dr. Radha Mary he is due for blood pressure check with Dr. Kat Kaufman offer pt appt 8/21 at 2 pm pt then stated he needed appt after 3 pm due to job. Inform pt that Dr. Kat Kaufman didn't have anything available after 3 pm until couple months out. Offer pt 9/5 at 2:20 pm and offered 9/12 at 2:40 pm. Pt then argue with me that he is not happy and don't understand why he should wait so far out for an appt and I stated the appt is far out because he stated he need appt after 3 pm and 9/12 is the closest I could schedule him.  Pt took appt for 9/12 at 2:40 pm-TM 8/17/23

## 2023-09-12 ENCOUNTER — OFFICE VISIT (OUTPATIENT)
Age: 58
End: 2023-09-12
Payer: COMMERCIAL

## 2023-09-12 VITALS
RESPIRATION RATE: 16 BRPM | SYSTOLIC BLOOD PRESSURE: 131 MMHG | HEART RATE: 74 BPM | OXYGEN SATURATION: 98 % | BODY MASS INDEX: 24.29 KG/M2 | WEIGHT: 164 LBS | HEIGHT: 69 IN | DIASTOLIC BLOOD PRESSURE: 80 MMHG | TEMPERATURE: 98.9 F

## 2023-09-12 DIAGNOSIS — Z12.5 PROSTATE CANCER SCREENING: ICD-10-CM

## 2023-09-12 DIAGNOSIS — E78.5 DYSLIPIDEMIA, GOAL LDL BELOW 100: ICD-10-CM

## 2023-09-12 DIAGNOSIS — N52.9 VASCULOGENIC ERECTILE DYSFUNCTION, UNSPECIFIED VASCULOGENIC ERECTILE DYSFUNCTION TYPE: ICD-10-CM

## 2023-09-12 DIAGNOSIS — J30.9 ALLERGIC SINUSITIS: ICD-10-CM

## 2023-09-12 DIAGNOSIS — R73.03 PREDIABETES: ICD-10-CM

## 2023-09-12 DIAGNOSIS — I10 ESSENTIAL HYPERTENSION: Primary | ICD-10-CM

## 2023-09-12 DIAGNOSIS — G93.31 POSTVIRAL SYNDROME: ICD-10-CM

## 2023-09-12 PROBLEM — I25.10 CORONARY ARTERIOSCLEROSIS: Status: ACTIVE | Noted: 2022-06-17

## 2023-09-12 LAB
COMMENT:: NORMAL
SPECIMEN HOLD: NORMAL
TSH SERPL DL<=0.05 MIU/L-ACNC: 2.09 UIU/ML (ref 0.36–3.74)

## 2023-09-12 PROCEDURE — 99214 OFFICE O/P EST MOD 30 MIN: CPT | Performed by: FAMILY MEDICINE

## 2023-09-12 PROCEDURE — 3075F SYST BP GE 130 - 139MM HG: CPT | Performed by: FAMILY MEDICINE

## 2023-09-12 PROCEDURE — 3079F DIAST BP 80-89 MM HG: CPT | Performed by: FAMILY MEDICINE

## 2023-09-12 RX ORDER — FAMOTIDINE 40 MG/1
40 TABLET, FILM COATED ORAL NIGHTLY
Qty: 30 TABLET | Refills: 1 | Status: SHIPPED | OUTPATIENT
Start: 2023-09-12

## 2023-09-12 ASSESSMENT — ENCOUNTER SYMPTOMS
SHORTNESS OF BREATH: 0
WHEEZING: 0
ABDOMINAL PAIN: 0
SORE THROAT: 0
COUGH: 0
NAUSEA: 0
CONSTIPATION: 0
DIARRHEA: 0
BACK PAIN: 0

## 2023-09-12 NOTE — PROGRESS NOTES
Chief Complaint   Patient presents with    Hypertension     Follow up    Post-COVID Symptoms     Pt has a lot of gas. Pt went to cardiology and they said everything is fine and was told chest pain can be due to gas. Pt wants to know if he can have a colonoscopy done. Pt doesn't have a sexual desire, erectile dysfunction    Hair/Scalp Problem     Dandruff pt used ketoconazole but from time to time. Pt wants a cream for scalp he was prescribed this medication and not sure what the name is         1. \"Have you been to the ER, urgent care clinic since your last visit? Hospitalized since your last visit? \"     no       2. \"Have you seen or consulted any other health care providers outside of the [address] [place] [address] since your last visit? \"       no     3. For patients aged 43-73: Has the patient had a colonoscopy / FIT/ Cologuard? Yes      If the patient is female: 4. For patients aged 43-66: Has the patient had a mammogram within the past 2 years? NA - based on age or sex      11. For patients aged 21-65: Has the patient had a pap smear?  NA - based on age or sex          3/14/2023    10:47 AM   PHQ-9    Little interest or pleasure in doing things 0   Feeling down, depressed, or hopeless 0   PHQ-2 Score 0   PHQ-9 Total Score 0           Financial Resource Strain: Not on file      Food Insecurity: Not on file          Health Maintenance Due   Topic Date Due    Hepatitis B vaccine (1 of 3 - 3-dose series) Never done    HIV screen  Never done    DTaP/Tdap/Td vaccine (1 - Tdap) Never done    Shingles vaccine (1 of 2) Never done    COVID-19 Vaccine (4 - Pfizer series) 03/16/2022    Flu vaccine (1) 08/01/2023
homophones, and other interpretive errors are inadvertently transcribed by the computer software. Please disregard these errors. Please excuse any errors that have escaped final proofreading. Thank you. No follow-ups on file.      Electronically signed by Kate Choe MD on 9/12/23 at 5:50 PM EDT

## 2023-09-13 LAB
EST. AVERAGE GLUCOSE BLD GHB EST-MCNC: 123 MG/DL
HBA1C MFR BLD: 5.9 % (ref 4–5.6)

## 2023-09-15 LAB
PSA SERPL-MCNC: 0.5 NG/ML (ref 0–4)
REFLEX CRITERIA: NORMAL
TESTOST SERPL-MCNC: 307 NG/DL (ref 264–916)

## 2023-09-20 LAB
TESTOST FREE SERPL-MCNC: 7.2 PG/ML (ref 7.2–24)
TESTOST SERPL-MCNC: 307 NG/DL (ref 264–916)

## 2023-09-22 DIAGNOSIS — M25.552 PAIN IN LEFT HIP: ICD-10-CM

## 2023-09-22 DIAGNOSIS — G93.31 POSTVIRAL SYNDROME: ICD-10-CM

## 2023-09-22 DIAGNOSIS — M70.72 OTHER BURSITIS OF HIP, LEFT HIP: ICD-10-CM

## 2023-09-25 RX ORDER — PSEUDOEPHED/ACETAMINOPH/DIPHEN 30MG-500MG
1 TABLET ORAL EVERY 8 HOURS PRN
Qty: 100 TABLET | Refills: 1 | OUTPATIENT
Start: 2023-09-25

## 2023-09-26 RX ORDER — FLUTICASONE PROPIONATE 50 MCG
SPRAY, SUSPENSION (ML) NASAL
Qty: 16 G | Refills: 1 | Status: SHIPPED | OUTPATIENT
Start: 2023-09-26

## 2023-09-26 NOTE — TELEPHONE ENCOUNTER
PCP: Dillon Morrison MD      No future appointments.     Requested Prescriptions     Pending Prescriptions Disp Refills    fluticasone (FLONASE) 50 MCG/ACT nasal spray [Pharmacy Med Name: FLUTICASONE PROP 50 MCG SPRAY]       Sig: SPRAY 1 SPRAY INTO EACH NOSTRIL EVERY DAY

## 2024-01-21 RX ORDER — MAGNESIUM OXIDE 500 MG
1 TABLET ORAL NIGHTLY
Qty: 30 TABLET | Refills: 1 | Status: SHIPPED | OUTPATIENT
Start: 2024-01-21

## 2024-01-25 ENCOUNTER — OFFICE VISIT (OUTPATIENT)
Age: 59
End: 2024-01-25
Payer: COMMERCIAL

## 2024-01-25 VITALS
SYSTOLIC BLOOD PRESSURE: 138 MMHG | WEIGHT: 157.8 LBS | RESPIRATION RATE: 14 BRPM | DIASTOLIC BLOOD PRESSURE: 86 MMHG | HEART RATE: 72 BPM | HEIGHT: 69 IN | BODY MASS INDEX: 23.37 KG/M2 | OXYGEN SATURATION: 98 % | TEMPERATURE: 96.8 F

## 2024-01-25 DIAGNOSIS — E78.5 DYSLIPIDEMIA, GOAL LDL BELOW 100: Primary | ICD-10-CM

## 2024-01-25 DIAGNOSIS — I10 ESSENTIAL HYPERTENSION: ICD-10-CM

## 2024-01-25 DIAGNOSIS — Z71.84 TRAVEL ADVICE ENCOUNTER: ICD-10-CM

## 2024-01-25 DIAGNOSIS — E78.2 MIXED HYPERLIPIDEMIA: ICD-10-CM

## 2024-01-25 DIAGNOSIS — I25.10 CORONARY ARTERIOSCLEROSIS: ICD-10-CM

## 2024-01-25 DIAGNOSIS — F51.01 PRIMARY INSOMNIA: ICD-10-CM

## 2024-01-25 DIAGNOSIS — R73.03 PREDIABETES: ICD-10-CM

## 2024-01-25 LAB
ALBUMIN SERPL-MCNC: 4.1 G/DL (ref 3.5–5)
ALBUMIN/GLOB SERPL: 1.1 (ref 1.1–2.2)
ALP SERPL-CCNC: 85 U/L (ref 45–117)
ALT SERPL-CCNC: 37 U/L (ref 12–78)
ANION GAP SERPL CALC-SCNC: 3 MMOL/L (ref 5–15)
AST SERPL-CCNC: 28 U/L (ref 15–37)
BILIRUB SERPL-MCNC: 0.7 MG/DL (ref 0.2–1)
BUN SERPL-MCNC: 15 MG/DL (ref 6–20)
BUN/CREAT SERPL: 13 (ref 12–20)
CALCIUM SERPL-MCNC: 9.4 MG/DL (ref 8.5–10.1)
CHLORIDE SERPL-SCNC: 106 MMOL/L (ref 97–108)
CHOLEST SERPL-MCNC: 95 MG/DL
CO2 SERPL-SCNC: 28 MMOL/L (ref 21–32)
CREAT SERPL-MCNC: 1.14 MG/DL (ref 0.7–1.3)
EST. AVERAGE GLUCOSE BLD GHB EST-MCNC: 128 MG/DL
GLOBULIN SER CALC-MCNC: 3.7 G/DL (ref 2–4)
GLUCOSE SERPL-MCNC: 108 MG/DL (ref 65–100)
HBA1C MFR BLD: 6.1 % (ref 4–5.6)
HDLC SERPL-MCNC: 37 MG/DL
HDLC SERPL: 2.6 (ref 0–5)
LDLC SERPL CALC-MCNC: 42.4 MG/DL (ref 0–100)
POTASSIUM SERPL-SCNC: 4.2 MMOL/L (ref 3.5–5.1)
PROT SERPL-MCNC: 7.8 G/DL (ref 6.4–8.2)
SODIUM SERPL-SCNC: 137 MMOL/L (ref 136–145)
TRIGL SERPL-MCNC: 78 MG/DL
VLDLC SERPL CALC-MCNC: 15.6 MG/DL

## 2024-01-25 PROCEDURE — 3079F DIAST BP 80-89 MM HG: CPT | Performed by: FAMILY MEDICINE

## 2024-01-25 PROCEDURE — 99214 OFFICE O/P EST MOD 30 MIN: CPT | Performed by: FAMILY MEDICINE

## 2024-01-25 PROCEDURE — 3075F SYST BP GE 130 - 139MM HG: CPT | Performed by: FAMILY MEDICINE

## 2024-01-25 RX ORDER — VALSARTAN 40 MG/1
20 TABLET ORAL DAILY
Qty: 45 TABLET | Refills: 1 | Status: SHIPPED | OUTPATIENT
Start: 2024-01-25

## 2024-01-25 RX ORDER — MAGNESIUM OXIDE 500 MG
1 TABLET ORAL NIGHTLY
Qty: 30 TABLET | Refills: 1 | Status: SHIPPED | OUTPATIENT
Start: 2024-01-25

## 2024-01-25 RX ORDER — MEFLOQUINE HYDROCHLORIDE 250 MG/1
250 TABLET ORAL
Qty: 4 TABLET | Refills: 0 | Status: SHIPPED | OUTPATIENT
Start: 2024-01-25 | End: 2024-02-16

## 2024-01-25 SDOH — ECONOMIC STABILITY: FOOD INSECURITY: WITHIN THE PAST 12 MONTHS, YOU WORRIED THAT YOUR FOOD WOULD RUN OUT BEFORE YOU GOT MONEY TO BUY MORE.: NEVER TRUE

## 2024-01-25 SDOH — ECONOMIC STABILITY: HOUSING INSECURITY
IN THE LAST 12 MONTHS, WAS THERE A TIME WHEN YOU DID NOT HAVE A STEADY PLACE TO SLEEP OR SLEPT IN A SHELTER (INCLUDING NOW)?: NO

## 2024-01-25 SDOH — ECONOMIC STABILITY: INCOME INSECURITY: HOW HARD IS IT FOR YOU TO PAY FOR THE VERY BASICS LIKE FOOD, HOUSING, MEDICAL CARE, AND HEATING?: NOT HARD AT ALL

## 2024-01-25 SDOH — ECONOMIC STABILITY: FOOD INSECURITY: WITHIN THE PAST 12 MONTHS, THE FOOD YOU BOUGHT JUST DIDN'T LAST AND YOU DIDN'T HAVE MONEY TO GET MORE.: NEVER TRUE

## 2024-01-25 ASSESSMENT — PATIENT HEALTH QUESTIONNAIRE - PHQ9
SUM OF ALL RESPONSES TO PHQ QUESTIONS 1-9: 0
1. LITTLE INTEREST OR PLEASURE IN DOING THINGS: 0
SUM OF ALL RESPONSES TO PHQ9 QUESTIONS 1 & 2: 0
2. FEELING DOWN, DEPRESSED OR HOPELESS: 0

## 2024-01-25 ASSESSMENT — ENCOUNTER SYMPTOMS
NAUSEA: 0
ABDOMINAL PAIN: 0
SHORTNESS OF BREATH: 0
BACK PAIN: 0
CONSTIPATION: 0
SORE THROAT: 0
COUGH: 0
WHEEZING: 0
DIARRHEA: 0

## 2024-01-25 NOTE — PROGRESS NOTES
Chief Complaint   Patient presents with    Discuss Labs     Cholesterol and other labs. Pt saw cardiologist and ws told to follow up with pcp     Travel Consult     Pt is travelling to [facility] there dengue fever and would like to know if there are any precautions.    Insomnia     Pt doesn't get enough sleep. Sleeps from 11-3. Pt doesn't sleep more than 5 hrs    Eye Problem     Pt's vision is decreasing and wants to know what is the reason     \"Have you been to the ER, urgent care clinic since your last visit?  Hospitalized since your last visit?\"    NO    “Have you seen or consulted any other health care providers outside of [facility] since your last visit?”    YES - When: approximately 1  weeks ago.  Where and Why: cardiologist .                   1/25/2024    10:49 AM   PHQ-9    Little interest or pleasure in doing things 0   Feeling down, depressed, or hopeless 0   PHQ-2 Score 0   PHQ-9 Total Score 0           Financial Resource Strain: Low Risk  (1/25/2024)    Overall Financial Resource Strain (CARDIA)     Difficulty of Paying Living Expenses: Not hard at all      Food Insecurity: No Food Insecurity (1/25/2024)    Hunger Vital Sign     Worried About Running Out of Food in the Last Year: Never true     Ran Out of Food in the Last Year: Never true          Health Maintenance Due   Topic Date Due    Hepatitis B vaccine (1 of 3 - 3-dose series) Never done    HIV screen  Never done    DTaP/Tdap/Td vaccine (1 - Tdap) Never done    Shingles vaccine (1 of 2) Never done    Flu vaccine (1) 08/01/2023    COVID-19 Vaccine (4 - 2023-24 season) 09/01/2023        
  Regional wall motion abnormalities: Mid A.S.: akinetic, Mid Sept: akinetic, Mid Ant: Hypokinetic, Apical Sept: Hypokinetic, Apical Inf: Hypokinetic, Apical Ant: hypokinetic  Mild mitral and mild tricuspid regurgitation. RVSP 34mmHg  Orders:  -     valsartan (DIOVAN) 40 MG tablet; Take 0.5 tablets by mouth daily, Disp-45 tablet, R-1Normal  -     Lipid Panel; Future  3. Essential hypertension  -     valsartan (DIOVAN) 40 MG tablet; Take 0.5 tablets by mouth daily, Disp-45 tablet, R-1Normal  -     Comprehensive Metabolic Panel; Future  4. Mixed hyperlipidemia  5. Prediabetes  -     Hemoglobin A1C; Future  -     Comprehensive Metabolic Panel; Future  6. Travel advice encounter  -     mefloquine (LARIAM) 250 MG tablet; Take 1 tablet by mouth every 7 days for 4 doses, Disp-4 tablet, R-0Normal  7. Primary insomnia  -     Melatonin ER 5 MG TBCR; Take 1 tablet by mouth nightly, Disp-30 tablet, R-1Normal     Today I had a long discussion on his compliance with medications.  I explained his previous major heart attack and preventative medications he needs to be on.  Discussed lifestyle issues and health guidance given  Patient was given an after visit summary which includes diagnoses, vital signs, current medications, instructions and references & authorized prescriptions . Results of labs will be conveyed to patient, once available.  Pt verbalized instructions I provided and expressed understanding of discussion that was held today.    Please note that this dictation was completed with [employer], the computer voice recognition software.  Quite often unanticipated grammatical, syntax, homophones, and other interpretive errors are inadvertently transcribed by the computer software.  Please disregard these errors.  Please excuse any errors that have escaped final proofreading.  Thank you.     No follow-ups on file.     Electronically signed by Elisha Marie MD on 1/25/24 at 12:33 PM EST

## 2024-02-18 DIAGNOSIS — Z91.09 OTHER ALLERGY STATUS, OTHER THAN TO DRUGS AND BIOLOGICAL SUBSTANCES: ICD-10-CM

## 2024-02-18 DIAGNOSIS — R51.9 HEADACHE, UNSPECIFIED: ICD-10-CM

## 2024-02-19 RX ORDER — FEXOFENADINE HCL 180 MG/1
180 TABLET ORAL DAILY
Qty: 30 TABLET | Refills: 0 | Status: SHIPPED | OUTPATIENT
Start: 2024-02-19

## 2024-04-24 ENCOUNTER — OFFICE VISIT (OUTPATIENT)
Age: 59
End: 2024-04-24
Payer: COMMERCIAL

## 2024-04-24 VITALS
BODY MASS INDEX: 24.44 KG/M2 | WEIGHT: 165 LBS | HEIGHT: 69 IN | HEART RATE: 77 BPM | RESPIRATION RATE: 16 BRPM | TEMPERATURE: 97.5 F | DIASTOLIC BLOOD PRESSURE: 78 MMHG | SYSTOLIC BLOOD PRESSURE: 121 MMHG | OXYGEN SATURATION: 98 %

## 2024-04-24 DIAGNOSIS — R91.1 PULMONARY NODULE, RIGHT: ICD-10-CM

## 2024-04-24 DIAGNOSIS — Z11.1 SCREENING-PULMONARY TB: ICD-10-CM

## 2024-04-24 DIAGNOSIS — M77.8 TENDINITIS OF RIGHT SHOULDER: Primary | ICD-10-CM

## 2024-04-24 PROCEDURE — 99213 OFFICE O/P EST LOW 20 MIN: CPT | Performed by: FAMILY MEDICINE

## 2024-04-24 PROCEDURE — 3078F DIAST BP <80 MM HG: CPT | Performed by: FAMILY MEDICINE

## 2024-04-24 PROCEDURE — 3074F SYST BP LT 130 MM HG: CPT | Performed by: FAMILY MEDICINE

## 2024-04-24 RX ORDER — ACETAMINOPHEN 500 MG
500 TABLET ORAL EVERY 8 HOURS PRN
Qty: 100 TABLET | Refills: 0 | Status: SHIPPED | OUTPATIENT
Start: 2024-04-24

## 2024-04-24 RX ORDER — CLOBETASOL PROPIONATE 0.5 MG/G
CREAM TOPICAL
COMMUNITY
Start: 2024-03-11

## 2024-04-24 ASSESSMENT — ENCOUNTER SYMPTOMS
NAUSEA: 0
BACK PAIN: 0
COUGH: 0
SORE THROAT: 0
SHORTNESS OF BREATH: 0
WHEEZING: 0
CONSTIPATION: 0
DIARRHEA: 0
ABDOMINAL PAIN: 0

## 2024-04-24 ASSESSMENT — PATIENT HEALTH QUESTIONNAIRE - PHQ9
SUM OF ALL RESPONSES TO PHQ QUESTIONS 1-9: 0
SUM OF ALL RESPONSES TO PHQ QUESTIONS 1-9: 0
SUM OF ALL RESPONSES TO PHQ9 QUESTIONS 1 & 2: 0
SUM OF ALL RESPONSES TO PHQ QUESTIONS 1-9: 0
SUM OF ALL RESPONSES TO PHQ QUESTIONS 1-9: 0
2. FEELING DOWN, DEPRESSED OR HOPELESS: NOT AT ALL
1. LITTLE INTEREST OR PLEASURE IN DOING THINGS: NOT AT ALL

## 2024-04-24 NOTE — PROGRESS NOTES
Chief Complaint   Patient presents with    Shoulder Pain    Congestion     \"Have you been to the ER, urgent care clinic since your last visit?  Hospitalized since your last visit?\"    Yes went to urgent care for congestion    “Have you seen or consulted any other health care providers outside of [facility] since your last visit?”    NO            Click Here for Release of Records Request             4/24/2024     3:05 PM   PHQ-9    Little interest or pleasure in doing things 0   Feeling down, depressed, or hopeless 0   PHQ-2 Score 0   PHQ-9 Total Score 0           Financial Resource Strain: Low Risk  (1/25/2024)    Overall Financial Resource Strain (CARDIA)     Difficulty of Paying Living Expenses: Not hard at all      Food Insecurity: No Food Insecurity (1/25/2024)    Hunger Vital Sign     Worried About Running Out of Food in the Last Year: Never true     Ran Out of Food in the Last Year: Never true          Health Maintenance Due   Topic Date Due    Hepatitis B vaccine (1 of 3 - 3-dose series) Never done    HIV screen  Never done    DTaP/Tdap/Td vaccine (1 - Tdap) Never done    Shingles vaccine (1 of 2) Never done       
and Hypertension.    Social History:   reports that he has never smoked. He has never used smokeless tobacco. He reports that he does not drink alcohol and does not use drugs.     Family History:   Family History   Problem Relation Age of Onset    Heart Disease Brother     Hypertension Brother     Hypertension Sister     Hypertension Sister     Hypertension Sister     Hypertension Sister     Hypertension Brother     Heart Disease Sister     Hypertension Sister     Heart Disease Mother     Cancer Father        Physical Examination      Vitals:  /78 (Site: Left Upper Arm, Position: Sitting, Cuff Size: Small Adult)   Pulse 77   Temp 97.5 °F (36.4 °C) (Temporal)   Resp 16   Ht 1.753 m (5' 9\")   Wt 74.8 kg (165 lb)   SpO2 98%   BMI 24.37 kg/m²       Physical Exam  Vitals and nursing note reviewed.   Constitutional:       Appearance: Normal appearance.   HENT:      Head: Normocephalic.      Right Ear: Tympanic membrane normal.      Left Ear: Tympanic membrane normal.      Nose: Nose normal.      Mouth/Throat:      Mouth: Mucous membranes are moist.   Eyes:      Extraocular Movements: Extraocular movements intact.      Pupils: Pupils are equal, round, and reactive to light.   Cardiovascular:      Rate and Rhythm: Normal rate and regular rhythm.      Pulses: Normal pulses.      Heart sounds: Normal heart sounds.   Pulmonary:      Effort: Pulmonary effort is normal. No respiratory distress.      Breath sounds: Normal breath sounds.   Abdominal:      General: There is no distension.      Palpations: Abdomen is soft.      Tenderness: There is no abdominal tenderness.   Musculoskeletal:         General: Normal range of motion.      Cervical back: Normal range of motion and neck supple.        Feet:       Comments: Right Shoulder- Empty Can Test: Positive. Drop Arm Test: Positive. Cross-Chest Test: Positive.  NEER test Negative  Shcwartz' test Negative  Pain worst with active movement.  No pain on passive movements

## 2024-04-29 LAB
GAMMA INTERFERON BACKGROUND BLD IA-ACNC: 1.29 IU/ML
M TB IFN-G BLD-IMP: POSITIVE
M TB IFN-G CD4+ BCKGRND COR BLD-ACNC: 2.86 IU/ML
M TB IFN-G CD4+CD8+ BCKGRND COR BLD-ACNC: 2.36 IU/ML
MITOGEN IGNF BCKGRD COR BLD-ACNC: >10 IU/ML
SERVICE CMNT-IMP: ABNORMAL

## 2024-05-03 ENCOUNTER — TELEPHONE (OUTPATIENT)
Age: 59
End: 2024-05-03

## 2024-05-06 NOTE — TELEPHONE ENCOUNTER
Sent pt a My Chart message informing him unfortunately there is no documentation where someone called him.

## 2024-05-09 ENCOUNTER — HOSPITAL ENCOUNTER (OUTPATIENT)
Facility: HOSPITAL | Age: 59
Discharge: HOME | End: 2024-05-09
Attending: FAMILY MEDICINE
Payer: COMMERCIAL

## 2024-05-09 DIAGNOSIS — R91.1 PULMONARY NODULE, RIGHT: ICD-10-CM

## 2024-05-09 PROCEDURE — 71250 CT THORAX DX C-: CPT

## 2024-06-03 DIAGNOSIS — Z71.84 TRAVEL ADVICE ENCOUNTER: ICD-10-CM

## 2024-06-04 RX ORDER — MEFLOQUINE HYDROCHLORIDE 250 MG/1
250 TABLET ORAL
Qty: 4 TABLET | Refills: 0 | Status: SHIPPED | OUTPATIENT
Start: 2024-06-04 | End: 2024-06-26

## 2024-06-04 NOTE — TELEPHONE ENCOUNTER
PCP: Elisha Marie MD      Future Appointments   Date Time Provider Department Center   6/6/2024  8:00 AM Elisha Marie MD [facility]       Requested Prescriptions     Pending Prescriptions Disp Refills    mefloquine (LARIAM) 250 MG tablet [Pharmacy Med Name: MEFLOQUINE  MG TABLET] 4 tablet 0     Sig: TAKE 1 TABLET BY MOUTH EVERY 7 DAYS FOR 4 DOSES       Prior labs and Blood pressures:  BP Readings from Last 3 Encounters:   04/24/24 121/78   01/25/24 138/86   09/12/23 131/80     Lab Results   Component Value Date/Time     01/25/2024 11:23 AM    K 4.2 01/25/2024 11:23 AM     01/25/2024 11:23 AM    CO2 28 01/25/2024 11:23 AM    BUN 15 01/25/2024 11:23 AM    GFRAA >60 06/28/2022 02:31 PM     No results found for: \"HBA1C\", \"[id number]\"  Lab Results   Component Value Date/Time    CHOL 95 01/25/2024 11:23 AM    HDL 37 01/25/2024 11:23 AM    LDL 42.4 01/25/2024 11:23 AM    VLDL 15.6 01/25/2024 11:23 AM    VLDL 27 09/24/2020 03:28 PM     No results found for: \"VITD3\"    Lab Results   Component Value Date/Time    TSH 1.46 03/31/2022 09:39 AM

## 2024-06-06 ENCOUNTER — OFFICE VISIT (OUTPATIENT)
Age: 59
End: 2024-06-06
Payer: COMMERCIAL

## 2024-06-06 VITALS
BODY MASS INDEX: 24.05 KG/M2 | HEIGHT: 69 IN | WEIGHT: 162.4 LBS | TEMPERATURE: 97.3 F | SYSTOLIC BLOOD PRESSURE: 104 MMHG | HEART RATE: 60 BPM | DIASTOLIC BLOOD PRESSURE: 78 MMHG | OXYGEN SATURATION: 98 %

## 2024-06-06 DIAGNOSIS — M62.830 LUMBAR PARASPINAL MUSCLE SPASM: Primary | ICD-10-CM

## 2024-06-06 DIAGNOSIS — A63.0 WARTS, GENITAL: ICD-10-CM

## 2024-06-06 DIAGNOSIS — K59.04 CHRONIC IDIOPATHIC CONSTIPATION: ICD-10-CM

## 2024-06-06 DIAGNOSIS — R10.9 BILATERAL FLANK PAIN: ICD-10-CM

## 2024-06-06 PROCEDURE — 3074F SYST BP LT 130 MM HG: CPT | Performed by: FAMILY MEDICINE

## 2024-06-06 PROCEDURE — 99213 OFFICE O/P EST LOW 20 MIN: CPT | Performed by: FAMILY MEDICINE

## 2024-06-06 PROCEDURE — 3078F DIAST BP <80 MM HG: CPT | Performed by: FAMILY MEDICINE

## 2024-06-06 RX ORDER — POLYETHYLENE GLYCOL 3350 17 G/17G
17 POWDER, FOR SOLUTION ORAL DAILY PRN
Qty: 510 G | Refills: 0 | Status: SHIPPED | OUTPATIENT
Start: 2024-06-06 | End: 2024-07-06

## 2024-06-06 RX ORDER — METHOCARBAMOL 500 MG/1
500 TABLET, FILM COATED ORAL 4 TIMES DAILY
Qty: 40 TABLET | Refills: 0 | Status: SHIPPED | OUTPATIENT
Start: 2024-06-06 | End: 2024-06-16

## 2024-06-06 RX ORDER — PODOFILOX 5 MG/ML
SOLUTION TOPICAL
Qty: 3.5 ML | Refills: 1 | Status: SHIPPED | OUTPATIENT
Start: 2024-06-06 | End: 2024-06-16

## 2024-06-06 ASSESSMENT — ENCOUNTER SYMPTOMS
NAUSEA: 0
ABDOMINAL PAIN: 0
DIARRHEA: 0
BACK PAIN: 1
CONSTIPATION: 1
SORE THROAT: 0
SHORTNESS OF BREATH: 0
COUGH: 0
WHEEZING: 0

## 2024-06-06 NOTE — PROGRESS NOTES
Chief Complaint   Patient presents with    Lower Back Pain     Started 10 days ago. Sitting most of the day. Pain scale 0. Comes and goes.      \"Have you been to the ER, urgent care clinic since your last visit?  Hospitalized since your last visit?\"    NO    “Have you seen or consulted any other health care providers outside of [facility] since your last visit?”    NO                   4/24/2024     3:05 PM   PHQ-9    Little interest or pleasure in doing things 0   Feeling down, depressed, or hopeless 0   PHQ-2 Score 0   PHQ-9 Total Score 0           Financial Resource Strain: Low Risk  (1/25/2024)    Overall Financial Resource Strain (CARDIA)     Difficulty of Paying Living Expenses: Not hard at all      Food Insecurity: No Food Insecurity (1/25/2024)    Hunger Vital Sign     Worried About Running Out of Food in the Last Year: Never true     Ran Out of Food in the Last Year: Never true          Health Maintenance Due   Topic Date Due    Hepatitis B vaccine (1 of 3 - 3-dose series) Never done    HIV screen  Never done    DTaP/Tdap/Td vaccine (1 - Tdap) Never done    Shingles vaccine (1 of 2) Never done       
  Neurological:      General: No focal deficit present.      Mental Status: He is alert and oriented to person, place, and time. Mental status is at baseline.   Psychiatric:         Mood and Affect: Mood normal.         Behavior: Behavior normal.         Labs/Imaging/Diagnostics   Labs:  Lab Results   Component Value Date    WBC 7.7 03/31/2022    HGB 14.7 03/31/2022    HCT 44.4 03/31/2022    MCV 85.1 03/31/2022     03/31/2022     Lab Results   Component Value Date     01/25/2024    K 4.2 01/25/2024     01/25/2024    CO2 28 01/25/2024    BUN 15 01/25/2024    CREATININE 1.14 01/25/2024    GLUCOSE 108 (H) 01/25/2024    CALCIUM 9.4 01/25/2024    BILITOT 0.7 01/25/2024    ALKPHOS 85 01/25/2024    AST 28 01/25/2024    ALT 37 01/25/2024    LABGLOM >60 01/25/2024    GFRAA >60 06/28/2022    AGRATIO 1.0 (L) 03/14/2023    GLOB 3.7 01/25/2024     Hemoglobin A1C   Date Value Ref Range Status   01/25/2024 6.1 (H) 4.0 - 5.6 % Final     Comment:     (NOTE)  HbA1C Interpretive Ranges  <5.7              Normal  5.7 - 6.4         Consider Prediabetes  >6.5              Consider Diabetes            Assessment & Plan      1. Lumbar paraspinal muscle spasm  -     methocarbamol (ROBAXIN) 500 MG tablet; Take 1 tablet by mouth 4 times daily for 10 days, Disp-40 tablet, R-0Normal  2. Chronic idiopathic constipation  -     Basic Metabolic Panel; Future  -     polyethylene glycol (GLYCOLAX) 17 GM/SCOOP powder; Take 17 g by mouth daily as needed (constipation), Disp-510 g, R-0Normal  3. Bilateral flank pain  -     Basic Metabolic Panel; Future  -     Urinalysis with Microscopic; Future  4. Warts, genital  -     podofilox (CONDYLOX) 0.5 % external solution; Apply topically 2 times daily x 3 days each week for 1 month, Disp-3.5 mL, R-1, Normal       Discussed lifestyle issues and health guidance given  Patient was given an after visit summary which includes diagnoses, vital signs, current medications, instructions and

## 2024-06-07 LAB
ANION GAP SERPL CALC-SCNC: 1 MMOL/L (ref 5–15)
APPEARANCE UR: CLEAR
BACTERIA URNS QL MICRO: NEGATIVE /HPF
BILIRUB UR QL: NEGATIVE
BUN SERPL-MCNC: 13 MG/DL (ref 6–20)
BUN/CREAT SERPL: 11 (ref 12–20)
CALCIUM SERPL-MCNC: 9.1 MG/DL (ref 8.5–10.1)
CHLORIDE SERPL-SCNC: 108 MMOL/L (ref 97–108)
CO2 SERPL-SCNC: 29 MMOL/L (ref 21–32)
COLOR UR: NORMAL
CREAT SERPL-MCNC: 1.14 MG/DL (ref 0.7–1.3)
EPITH CASTS URNS QL MICRO: NORMAL /LPF
GLUCOSE SERPL-MCNC: 101 MG/DL (ref 65–100)
GLUCOSE UR STRIP.AUTO-MCNC: NEGATIVE MG/DL
HGB UR QL STRIP: NEGATIVE
KETONES UR QL STRIP.AUTO: NEGATIVE MG/DL
LEUKOCYTE ESTERASE UR QL STRIP.AUTO: NEGATIVE
NITRITE UR QL STRIP.AUTO: NEGATIVE
PH UR STRIP: 6 (ref 5–8)
POTASSIUM SERPL-SCNC: 4.2 MMOL/L (ref 3.5–5.1)
PROT UR STRIP-MCNC: NEGATIVE MG/DL
RBC #/AREA URNS HPF: NORMAL /HPF (ref 0–5)
SODIUM SERPL-SCNC: 138 MMOL/L (ref 136–145)
SP GR UR REFRACTOMETRY: 1.02 (ref 1–1.03)
UROBILINOGEN UR QL STRIP.AUTO: 0.2 EU/DL (ref 0.2–1)
WBC URNS QL MICRO: NORMAL /HPF (ref 0–4)

## 2024-06-27 DIAGNOSIS — K59.04 CHRONIC IDIOPATHIC CONSTIPATION: ICD-10-CM

## 2024-06-28 RX ORDER — POLYETHYLENE GLYCOL 3350 17 G/DOSE
POWDER (GRAM) ORAL
Qty: 510 G | Refills: 0 | Status: SHIPPED | OUTPATIENT
Start: 2024-06-28

## 2024-08-31 DIAGNOSIS — R51.9 HEADACHE, UNSPECIFIED: ICD-10-CM

## 2024-08-31 DIAGNOSIS — Z91.09 OTHER ALLERGY STATUS, OTHER THAN TO DRUGS AND BIOLOGICAL SUBSTANCES: ICD-10-CM

## 2024-09-02 RX ORDER — FEXOFENADINE HCL 180 MG/1
180 TABLET ORAL DAILY
Qty: 30 TABLET | Refills: 0 | Status: SHIPPED | OUTPATIENT
Start: 2024-09-02

## 2024-09-20 DIAGNOSIS — R51.9 HEADACHE, UNSPECIFIED: ICD-10-CM

## 2024-09-20 DIAGNOSIS — A63.0 WARTS, GENITAL: ICD-10-CM

## 2024-09-20 DIAGNOSIS — Z91.09 OTHER ALLERGY STATUS, OTHER THAN TO DRUGS AND BIOLOGICAL SUBSTANCES: ICD-10-CM

## 2024-09-21 RX ORDER — FAMOTIDINE 40 MG/1
40 TABLET, FILM COATED ORAL NIGHTLY
Qty: 30 TABLET | Refills: 1 | Status: SHIPPED | OUTPATIENT
Start: 2024-09-21

## 2024-09-21 RX ORDER — PODOFILOX 5 MG/ML
SOLUTION TOPICAL
Qty: 3.5 ML | Refills: 1 | Status: SHIPPED | OUTPATIENT
Start: 2024-09-21

## 2024-09-21 RX ORDER — FEXOFENADINE HCL 180 MG/1
180 TABLET ORAL DAILY
Qty: 30 TABLET | Refills: 0 | Status: SHIPPED | OUTPATIENT
Start: 2024-09-21

## 2024-11-05 DIAGNOSIS — Z91.09 OTHER ALLERGY STATUS, OTHER THAN TO DRUGS AND BIOLOGICAL SUBSTANCES: ICD-10-CM

## 2024-11-05 DIAGNOSIS — R51.9 HEADACHE, UNSPECIFIED: ICD-10-CM

## 2024-11-05 RX ORDER — FEXOFENADINE HCL 180 MG/1
180 TABLET ORAL DAILY
Qty: 30 TABLET | Refills: 0 | Status: SHIPPED | OUTPATIENT
Start: 2024-11-05

## 2024-12-13 ENCOUNTER — OFFICE VISIT (OUTPATIENT)
Age: 59
End: 2024-12-13
Payer: COMMERCIAL

## 2024-12-13 VITALS
TEMPERATURE: 97.5 F | HEART RATE: 65 BPM | OXYGEN SATURATION: 99 % | SYSTOLIC BLOOD PRESSURE: 124 MMHG | HEIGHT: 69 IN | BODY MASS INDEX: 24.37 KG/M2 | DIASTOLIC BLOOD PRESSURE: 75 MMHG | RESPIRATION RATE: 14 BRPM | WEIGHT: 164.5 LBS

## 2024-12-13 DIAGNOSIS — Z23 NEEDS FLU SHOT: ICD-10-CM

## 2024-12-13 DIAGNOSIS — K52.9 ACUTE GASTROENTERITIS: ICD-10-CM

## 2024-12-13 DIAGNOSIS — R19.7 DIARRHEA, UNSPECIFIED TYPE: Primary | ICD-10-CM

## 2024-12-13 PROCEDURE — 90661 CCIIV3 VAC ABX FR 0.5 ML IM: CPT | Performed by: NURSE PRACTITIONER

## 2024-12-13 PROCEDURE — 99213 OFFICE O/P EST LOW 20 MIN: CPT | Performed by: NURSE PRACTITIONER

## 2024-12-13 RX ORDER — LOPERAMIDE HYDROCHLORIDE 2 MG/1
2 TABLET ORAL 4 TIMES DAILY PRN
Qty: 30 TABLET | Refills: 0 | Status: SHIPPED | OUTPATIENT
Start: 2024-12-13 | End: 2024-12-23

## 2024-12-13 RX ORDER — SIMETHICONE 80 MG
80 TABLET,CHEWABLE ORAL 4 TIMES DAILY PRN
Qty: 60 TABLET | Refills: 0 | Status: SHIPPED | OUTPATIENT
Start: 2024-12-13

## 2024-12-13 ASSESSMENT — ENCOUNTER SYMPTOMS
BLOOD IN STOOL: 0
NAUSEA: 0
ABDOMINAL PAIN: 0
VOMITING: 0
DIARRHEA: 1

## 2024-12-13 NOTE — PATIENT INSTRUCTIONS
You can try Gas-X (simethicone) to help with gas.    I would also recommend a probiotic (Align) or yogurt with cultures to take daily.    Stick to a bland diet.    Imodium can help with soft stools.

## 2024-12-13 NOTE — PROGRESS NOTES
Chief Complaint   Patient presents with    GI Problem     C/o diarrhea x 10 days ago - continued for 2 days - increased hydration. Still having uncomfortable sensation in stomach - increase in urgency and frequency since. Complaining of lots of gas/burping.     \"Have you been to the ER, urgent care clinic since your last visit?  Hospitalized since your last visit?\"    NO    “Have you seen or consulted any other health care providers outside of [facility] since your last visit?”    NO                   4/24/2024     3:05 PM   PHQ-9    Little interest or pleasure in doing things 0   Feeling down, depressed, or hopeless 0   PHQ-2 Score 0   PHQ-9 Total Score 0           Financial Resource Strain: Low Risk  (1/25/2024)    Overall Financial Resource Strain (CARDIA)     Difficulty of Paying Living Expenses: Not hard at all      Food Insecurity: No Food Insecurity (1/25/2024)    Hunger Vital Sign     Worried About Running Out of Food in the Last Year: Never true     Ran Out of Food in the Last Year: Never true          Health Maintenance Due   Topic Date Due    HIV screen  Never done    Hepatitis B vaccine (1 of 3 - 19+ 3-dose series) Never done    DTaP/Tdap/Td vaccine (1 - Tdap) Never done    Shingles vaccine (1 of 2) Never done    Flu vaccine (1) 08/01/2024

## 2025-01-26 DIAGNOSIS — I25.10 CORONARY ARTERIOSCLEROSIS: ICD-10-CM

## 2025-01-26 DIAGNOSIS — I10 ESSENTIAL HYPERTENSION: ICD-10-CM

## 2025-01-27 RX ORDER — VALSARTAN 40 MG/1
20 TABLET ORAL DAILY
Qty: 15 TABLET | Refills: 5 | Status: SHIPPED | OUTPATIENT
Start: 2025-01-27

## 2025-02-13 ENCOUNTER — OFFICE VISIT (OUTPATIENT)
Age: 60
End: 2025-02-13
Payer: COMMERCIAL

## 2025-02-13 VITALS
DIASTOLIC BLOOD PRESSURE: 73 MMHG | HEART RATE: 67 BPM | WEIGHT: 164 LBS | RESPIRATION RATE: 16 BRPM | SYSTOLIC BLOOD PRESSURE: 116 MMHG | OXYGEN SATURATION: 99 % | TEMPERATURE: 97.7 F | HEIGHT: 69 IN | BODY MASS INDEX: 24.29 KG/M2

## 2025-02-13 DIAGNOSIS — H53.8 BLURRED VISION, BILATERAL: ICD-10-CM

## 2025-02-13 DIAGNOSIS — E78.5 DYSLIPIDEMIA, GOAL LDL BELOW 100: ICD-10-CM

## 2025-02-13 DIAGNOSIS — Z12.5 PROSTATE CANCER SCREENING: ICD-10-CM

## 2025-02-13 DIAGNOSIS — R68.82 LIBIDO, DECREASED: ICD-10-CM

## 2025-02-13 DIAGNOSIS — E78.2 MIXED HYPERLIPIDEMIA: ICD-10-CM

## 2025-02-13 DIAGNOSIS — R30.0 DYSURIA: ICD-10-CM

## 2025-02-13 DIAGNOSIS — N52.9 ERECTILE DYSFUNCTION, UNSPECIFIED ERECTILE DYSFUNCTION TYPE: ICD-10-CM

## 2025-02-13 DIAGNOSIS — I10 ESSENTIAL HYPERTENSION: Primary | ICD-10-CM

## 2025-02-13 DIAGNOSIS — F51.01 PRIMARY INSOMNIA: ICD-10-CM

## 2025-02-13 DIAGNOSIS — I25.2 HISTORY OF ST ELEVATION MYOCARDIAL INFARCTION (STEMI): ICD-10-CM

## 2025-02-13 DIAGNOSIS — R73.03 PREDIABETES: ICD-10-CM

## 2025-02-13 DIAGNOSIS — Z13.0 SCREENING, ANEMIA, DEFICIENCY, IRON: ICD-10-CM

## 2025-02-13 PROCEDURE — 99214 OFFICE O/P EST MOD 30 MIN: CPT | Performed by: FAMILY MEDICINE

## 2025-02-13 PROCEDURE — 3074F SYST BP LT 130 MM HG: CPT | Performed by: FAMILY MEDICINE

## 2025-02-13 PROCEDURE — 3078F DIAST BP <80 MM HG: CPT | Performed by: FAMILY MEDICINE

## 2025-02-13 SDOH — ECONOMIC STABILITY: FOOD INSECURITY: WITHIN THE PAST 12 MONTHS, YOU WORRIED THAT YOUR FOOD WOULD RUN OUT BEFORE YOU GOT MONEY TO BUY MORE.: NEVER TRUE

## 2025-02-13 SDOH — ECONOMIC STABILITY: FOOD INSECURITY: WITHIN THE PAST 12 MONTHS, THE FOOD YOU BOUGHT JUST DIDN'T LAST AND YOU DIDN'T HAVE MONEY TO GET MORE.: NEVER TRUE

## 2025-02-13 ASSESSMENT — PATIENT HEALTH QUESTIONNAIRE - PHQ9
SUM OF ALL RESPONSES TO PHQ QUESTIONS 1-9: 0
SUM OF ALL RESPONSES TO PHQ QUESTIONS 1-9: 0
2. FEELING DOWN, DEPRESSED OR HOPELESS: NOT AT ALL
SUM OF ALL RESPONSES TO PHQ QUESTIONS 1-9: 0
SUM OF ALL RESPONSES TO PHQ9 QUESTIONS 1 & 2: 0
SUM OF ALL RESPONSES TO PHQ QUESTIONS 1-9: 0
1. LITTLE INTEREST OR PLEASURE IN DOING THINGS: NOT AT ALL

## 2025-02-13 ASSESSMENT — ENCOUNTER SYMPTOMS
COUGH: 0
NAUSEA: 0
DIARRHEA: 0
CONSTIPATION: 0
SHORTNESS OF BREATH: 0
SORE THROAT: 0
WHEEZING: 0
BACK PAIN: 0
ABDOMINAL PAIN: 0

## 2025-02-13 NOTE — PROGRESS NOTES
Chief Complaint   Patient presents with    Urinary Burning     X 1 month. There is foam in the urine    Erectile Dysfunction     Decreased libido     Eye Problem     Pt wears reading glasses but vision is not clear    Blood Sugar Problem     Pt wants to recheck sugar level         \"Have you been to the ER, urgent care clinic since your last visit?  Hospitalized since your last visit?\"    NO    “Have you seen or consulted any other health care providers outside of [facility] since your last visit?”    NO            Click Here for Release of Records Request           2/13/2025     3:09 PM   PHQ-9    Little interest or pleasure in doing things 0   Feeling down, depressed, or hopeless 0   PHQ-2 Score 0   PHQ-9 Total Score 0           Financial Resource Strain: Low Risk  (1/25/2024)    Overall Financial Resource Strain (CARDIA)     Difficulty of Paying Living Expenses: Not hard at all      Food Insecurity: No Food Insecurity (2/13/2025)    Hunger Vital Sign     Worried About Running Out of Food in the Last Year: Never true     Ran Out of Food in the Last Year: Never true          Health Maintenance Due   Topic Date Due    HIV screen  Never done    Hepatitis B vaccine (1 of 3 - 19+ 3-dose series) Never done    DTaP/Tdap/Td vaccine (1 - Tdap) Never done    Shingles vaccine (1 of 2) Never done    Pneumococcal 50+ years Vaccine (1 of 1 - PCV) Never done    COVID-19 Vaccine (5 - 2024-25 season) 09/01/2024    A1C test (Diabetic or Prediabetic)  01/25/2025    Lipids  01/25/2025        
Normal range of motion and neck supple.   Skin:     General: Skin is warm.      Capillary Refill: Capillary refill takes less than 2 seconds.      Findings: No rash.   Neurological:      General: No focal deficit present.      Mental Status: He is alert and oriented to person, place, and time. Mental status is at baseline.   Psychiatric:         Mood and Affect: Mood normal.         Behavior: Behavior normal.         Labs/Imaging/Diagnostics   Labs:  Lab Results   Component Value Date    WBC 7.7 03/31/2022    HGB 14.7 03/31/2022    HCT 44.4 03/31/2022    MCV 85.1 03/31/2022     03/31/2022     Lab Results   Component Value Date     06/06/2024    K 4.2 06/06/2024     06/06/2024    CO2 29 06/06/2024    BUN 13 06/06/2024    CREATININE 1.14 06/06/2024    GLUCOSE 101 (H) 06/06/2024    CALCIUM 9.1 06/06/2024    BILITOT 0.7 01/25/2024    ALKPHOS 85 01/25/2024    AST 28 01/25/2024    ALT 37 01/25/2024    LABGLOM 74 06/06/2024    GFRAA >60 06/28/2022    AGRATIO 1.0 (L) 03/14/2023    GLOB 3.7 01/25/2024     Hemoglobin A1C   Date Value Ref Range Status   01/25/2024 6.1 (H) 4.0 - 5.6 % Final     Comment:     (NOTE)  HbA1C Interpretive Ranges  <5.7              Normal  5.7 - 6.4         Consider Prediabetes  >6.5              Consider Diabetes            Assessment & Plan      1. Essential hypertension  -     Comprehensive Metabolic Panel; Future  2. History of ST elevation myocardial infarction (STEMI)  -     Lipid Panel; Future  3. Mixed hyperlipidemia  -     Comprehensive Metabolic Panel; Future  -     Lipid Panel; Future  4. Dyslipidemia, goal LDL below 100  -     Comprehensive Metabolic Panel; Future  -     Lipid Panel; Future  5. Prediabetes  -     Comprehensive Metabolic Panel; Future  -     Hemoglobin A1C; Future  6. Primary insomnia  7. Erectile dysfunction, unspecified erectile dysfunction type  -     AFL - Ray Lowe MD, UrologyJose Luis ([facility])  8. Blurred vision, bilateral  -

## 2025-02-14 ENCOUNTER — LAB (OUTPATIENT)
Age: 60
End: 2025-02-14

## 2025-02-14 DIAGNOSIS — Z13.0 SCREENING, ANEMIA, DEFICIENCY, IRON: ICD-10-CM

## 2025-02-14 DIAGNOSIS — E78.2 MIXED HYPERLIPIDEMIA: ICD-10-CM

## 2025-02-14 DIAGNOSIS — I25.2 HISTORY OF ST ELEVATION MYOCARDIAL INFARCTION (STEMI): ICD-10-CM

## 2025-02-14 DIAGNOSIS — R73.03 PREDIABETES: ICD-10-CM

## 2025-02-14 DIAGNOSIS — R30.0 DYSURIA: ICD-10-CM

## 2025-02-14 DIAGNOSIS — Z12.5 PROSTATE CANCER SCREENING: ICD-10-CM

## 2025-02-14 DIAGNOSIS — I10 ESSENTIAL HYPERTENSION: ICD-10-CM

## 2025-02-14 DIAGNOSIS — E78.5 DYSLIPIDEMIA, GOAL LDL BELOW 100: ICD-10-CM

## 2025-02-14 LAB
ALBUMIN SERPL-MCNC: 3.8 G/DL (ref 3.5–5)
ALBUMIN/GLOB SERPL: 1 (ref 1.1–2.2)
ALP SERPL-CCNC: 94 U/L (ref 45–117)
ALT SERPL-CCNC: 36 U/L (ref 12–78)
ANION GAP SERPL CALC-SCNC: 6 MMOL/L (ref 2–12)
APPEARANCE UR: CLEAR
AST SERPL-CCNC: 28 U/L (ref 15–37)
BACTERIA URNS QL MICRO: NEGATIVE /HPF
BASOPHILS # BLD: 0.04 K/UL (ref 0–0.1)
BASOPHILS NFR BLD: 0.6 % (ref 0–1)
BILIRUB SERPL-MCNC: 0.8 MG/DL (ref 0.2–1)
BILIRUB UR QL: NEGATIVE
BUN SERPL-MCNC: 14 MG/DL (ref 6–20)
BUN/CREAT SERPL: 13 (ref 12–20)
CALCIUM SERPL-MCNC: 9.1 MG/DL (ref 8.5–10.1)
CHLORIDE SERPL-SCNC: 104 MMOL/L (ref 97–108)
CHOLEST SERPL-MCNC: 99 MG/DL
CO2 SERPL-SCNC: 29 MMOL/L (ref 21–32)
COLOR UR: NORMAL
CREAT SERPL-MCNC: 1.09 MG/DL (ref 0.7–1.3)
DIFFERENTIAL METHOD BLD: NORMAL
EOSINOPHIL # BLD: 0.17 K/UL (ref 0–0.4)
EOSINOPHIL NFR BLD: 2.5 % (ref 0–7)
EPITH CASTS URNS QL MICRO: NORMAL /LPF
ERYTHROCYTE [DISTWIDTH] IN BLOOD BY AUTOMATED COUNT: 13.1 % (ref 11.5–14.5)
EST. AVERAGE GLUCOSE BLD GHB EST-MCNC: 123 MG/DL
GLOBULIN SER CALC-MCNC: 4 G/DL (ref 2–4)
GLUCOSE SERPL-MCNC: 102 MG/DL (ref 65–100)
GLUCOSE UR STRIP.AUTO-MCNC: NEGATIVE MG/DL
HBA1C MFR BLD: 5.9 % (ref 4–5.6)
HCT VFR BLD AUTO: 44.1 % (ref 36.6–50.3)
HDLC SERPL-MCNC: 32 MG/DL
HDLC SERPL: 3.1 (ref 0–5)
HGB BLD-MCNC: 14.5 G/DL (ref 12.1–17)
HGB UR QL STRIP: NEGATIVE
HYALINE CASTS URNS QL MICRO: NORMAL /LPF (ref 0–5)
IMM GRANULOCYTES # BLD AUTO: 0.01 K/UL (ref 0–0.04)
IMM GRANULOCYTES NFR BLD AUTO: 0.1 % (ref 0–0.5)
KETONES UR QL STRIP.AUTO: NEGATIVE MG/DL
LDLC SERPL CALC-MCNC: 50 MG/DL (ref 0–100)
LEUKOCYTE ESTERASE UR QL STRIP.AUTO: NEGATIVE
LYMPHOCYTES # BLD: 2.22 K/UL (ref 0.8–3.5)
LYMPHOCYTES NFR BLD: 32.5 % (ref 12–49)
MCH RBC QN AUTO: 28.3 PG (ref 26–34)
MCHC RBC AUTO-ENTMCNC: 32.9 G/DL (ref 30–36.5)
MCV RBC AUTO: 86.1 FL (ref 80–99)
MONOCYTES # BLD: 0.49 K/UL (ref 0–1)
MONOCYTES NFR BLD: 7.2 % (ref 5–13)
NEUTS SEG # BLD: 3.91 K/UL (ref 1.8–8)
NEUTS SEG NFR BLD: 57.1 % (ref 32–75)
NITRITE UR QL STRIP.AUTO: NEGATIVE
NRBC # BLD: 0 K/UL (ref 0–0.01)
NRBC BLD-RTO: 0 PER 100 WBC
PH UR STRIP: 5.5 (ref 5–8)
PLATELET # BLD AUTO: 185 K/UL (ref 150–400)
PMV BLD AUTO: 12.2 FL (ref 8.9–12.9)
POTASSIUM SERPL-SCNC: 3.9 MMOL/L (ref 3.5–5.1)
PROT SERPL-MCNC: 7.8 G/DL (ref 6.4–8.2)
PROT UR STRIP-MCNC: NEGATIVE MG/DL
RBC # BLD AUTO: 5.12 M/UL (ref 4.1–5.7)
RBC #/AREA URNS HPF: NORMAL /HPF (ref 0–5)
SODIUM SERPL-SCNC: 139 MMOL/L (ref 136–145)
SP GR UR REFRACTOMETRY: 1.01 (ref 1–1.03)
TRIGL SERPL-MCNC: 85 MG/DL
URINE CULTURE IF INDICATED: NORMAL
UROBILINOGEN UR QL STRIP.AUTO: 0.2 EU/DL (ref 0.2–1)
VLDLC SERPL CALC-MCNC: 17 MG/DL
WBC # BLD AUTO: 6.8 K/UL (ref 4.1–11.1)
WBC URNS QL MICRO: NORMAL /HPF (ref 0–4)

## 2025-02-15 NOTE — RESULT ENCOUNTER NOTE
Abeba Gallardo,  I have reviewed your results.  Results of blood count, kidney and liver function, cholesterol profile, urine analysis that we Checked for your burning in urination are all in normal range.  A1c, average sugar for diabetics persistently stays in prediabetic range.  No need to be on medicine but need to watch your diet strictly and limit carbohydrate.  I will update you once I get your prostate cancer screen report.  Continue with your current medications and let me know if any questions, thanks.

## 2025-02-17 LAB
PSA SERPL-MCNC: 0.6 NG/ML (ref 0–4)
REFLEX CRITERIA: NORMAL

## 2025-02-27 RX ORDER — FAMOTIDINE 40 MG/1
40 TABLET, FILM COATED ORAL NIGHTLY
Qty: 30 TABLET | Refills: 1 | Status: SHIPPED | OUTPATIENT
Start: 2025-02-27

## 2025-03-05 ENCOUNTER — TELEPHONE (OUTPATIENT)
Age: 60
End: 2025-03-05

## 2025-03-05 NOTE — TELEPHONE ENCOUNTER
Outbound call to patient. Name and  verified. Pt called ophthalmology and was asked what kind of problem you have. Pt told them the symptoms. Will get the information. But didn't get a call back. Advised pt to call opthalmology again. Faxed referral and office visit note.  Discussed lab results per provider's note

## 2025-03-05 NOTE — TELEPHONE ENCOUNTER
Patient call complaining of stomach problems would like for nurse to call.    Best call back # [phone]

## 2025-05-28 ENCOUNTER — OFFICE VISIT (OUTPATIENT)
Age: 60
End: 2025-05-28
Payer: COMMERCIAL

## 2025-05-28 VITALS
OXYGEN SATURATION: 98 % | BODY MASS INDEX: 24.44 KG/M2 | HEART RATE: 71 BPM | RESPIRATION RATE: 16 BRPM | WEIGHT: 165 LBS | TEMPERATURE: 97.8 F | HEIGHT: 69 IN | DIASTOLIC BLOOD PRESSURE: 76 MMHG | SYSTOLIC BLOOD PRESSURE: 120 MMHG

## 2025-05-28 DIAGNOSIS — L23.7 ALLERGIC CONTACT DERMATITIS DUE TO PLANTS, EXCEPT FOOD: ICD-10-CM

## 2025-05-28 DIAGNOSIS — H11.002 PTERYGIUM OF LEFT EYE: ICD-10-CM

## 2025-05-28 DIAGNOSIS — Z01.818 PREOP GENERAL PHYSICAL EXAM: Primary | ICD-10-CM

## 2025-05-28 PROCEDURE — 3074F SYST BP LT 130 MM HG: CPT | Performed by: FAMILY MEDICINE

## 2025-05-28 PROCEDURE — 99214 OFFICE O/P EST MOD 30 MIN: CPT | Performed by: FAMILY MEDICINE

## 2025-05-28 PROCEDURE — 3078F DIAST BP <80 MM HG: CPT | Performed by: FAMILY MEDICINE

## 2025-05-28 RX ORDER — TRIAMCINOLONE ACETONIDE 1 MG/G
OINTMENT TOPICAL 2 TIMES DAILY
Qty: 15 G | Refills: 0 | Status: SHIPPED | OUTPATIENT
Start: 2025-05-28 | End: 2025-06-04

## 2025-05-28 ASSESSMENT — PATIENT HEALTH QUESTIONNAIRE - PHQ9
SUM OF ALL RESPONSES TO PHQ QUESTIONS 1-9: 0
SUM OF ALL RESPONSES TO PHQ QUESTIONS 1-9: 0
1. LITTLE INTEREST OR PLEASURE IN DOING THINGS: NOT AT ALL
2. FEELING DOWN, DEPRESSED OR HOPELESS: NOT AT ALL
SUM OF ALL RESPONSES TO PHQ QUESTIONS 1-9: 0
SUM OF ALL RESPONSES TO PHQ QUESTIONS 1-9: 0

## 2025-05-28 ASSESSMENT — ENCOUNTER SYMPTOMS
SORE THROAT: 0
WHEEZING: 0
DIARRHEA: 0
ABDOMINAL PAIN: 0
CONSTIPATION: 0
NAUSEA: 0
BACK PAIN: 0
COUGH: 0
SHORTNESS OF BREATH: 0

## 2025-05-28 NOTE — PROGRESS NOTES
Chief Complaint   Patient presents with    Pre-op Exam     Pterygium excision with conj autograft    Eczema     Dry skin and eczema in the right finger and hands         \"Have you been to the ER, urgent care clinic since your last visit?  Hospitalized since your last visit?\"    NO    “Have you seen or consulted any other health care providers outside of [facility] since your last visit?”    YES - When: approximately weeks ago ago.  Where and Why: eye surgeon .            Click Here for Release of Records Request           5/28/2025     3:35 PM   PHQ-9    Little interest or pleasure in doing things 0   Feeling down, depressed, or hopeless 0   PHQ-2 Score 0   PHQ-9 Total Score 0           Financial Resource Strain: Low Risk  (1/25/2024)    Overall Financial Resource Strain (CARDIA)     Difficulty of Paying Living Expenses: Not hard at all      Food Insecurity: No Food Insecurity (2/13/2025)    Hunger Vital Sign     Worried About Running Out of Food in the Last Year: Never true     Ran Out of Food in the Last Year: Never true          Health Maintenance Due   Topic Date Due    HIV screen  Never done    DTaP/Tdap/Td vaccine (1 - Tdap) Never done    Shingles vaccine (1 of 2) Never done    Pneumococcal 50+ years Vaccine (1 of 1 - PCV) Never done    COVID-19 Vaccine (5 - 2024-25 season) 09/01/2024

## 2025-05-28 NOTE — PROGRESS NOTES
Date:2025        Patient Name:Paulina Jay     YOB: 1965     Age:60 y.o.  The patient (or guardian, if applicable) and other individuals in attendance with the patient were advised that Artificial Intelligence will be utilized during this visit to record, process the conversation to generate a clinical note, and support improvement of the AI technology. The patient (or guardian, if applicable) and other individuals in attendance at the appointment consented to the use of AI, including the recording.      Seen today for   Chief Complaint   Patient presents with    Pre-op Exam     Pterygium excision with conj autograft    Eczema     Dry skin and eczema in the right finger and hands       HPI     History of Present Illness  The patient is a 60-year-old male who presents today for preoperative clearance and paperwork for upcoming eye surgery.    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 His insurance coverage is set to  in 10/2025, necessitating the completion of both procedures before this date. The patient is scheduled for surgery on Monday at 7:00 AM.    He reports experiencing eczema, which he attributes to cold weather conditions. He has been managing the condition with a cream, which he finds effective. He also mentions occasional cracking of his skin, which can be painful. He does not have any known allergies or reactions to Lidex.    He has a history of hypertension, hypercholesterolemia, and heart disease. He experienced a myocardial infarction in the past and underwent stent placement on 2021. He does not have any history of cardiac bypass surgery, congestive heart failure, pacemaker or ICD implantation, atrial fibrillation, valvular disease, carotid artery disease, peripheral artery disease, cerebrovascular accident, asthma, or COPD. He does not smoke cigarettes and does

## 2025-06-17 ENCOUNTER — TELEPHONE (OUTPATIENT)
Age: 60
End: 2025-06-17

## 2025-06-18 LAB — HBA1C MFR BLD HPLC: 6.3 %

## 2025-06-18 NOTE — TELEPHONE ENCOUNTER
Outbound call to Pt . Name and  verified.     Provided inf per PCP. Pt stated understanding.   
PT called requesting a call back about a referral  
Please let patient know that he completed Cologuard in February and it was negative.  Insurance not will cover for colonoscopy for colon cancer screening.  They did not cover for 2 different options.  If he has any medical condition or concern then he will need to see a GI doctor and they can decide whether he can go forward diagnostic colonoscopy.  
80.7

## 2025-06-24 ENCOUNTER — TELEPHONE (OUTPATIENT)
Age: 60
End: 2025-06-24

## 2025-06-26 ENCOUNTER — OFFICE VISIT (OUTPATIENT)
Age: 60
End: 2025-06-26
Payer: COMMERCIAL

## 2025-06-26 VITALS
HEIGHT: 69 IN | HEART RATE: 66 BPM | OXYGEN SATURATION: 97 % | TEMPERATURE: 97 F | BODY MASS INDEX: 23.96 KG/M2 | SYSTOLIC BLOOD PRESSURE: 113 MMHG | RESPIRATION RATE: 16 BRPM | WEIGHT: 161.8 LBS | DIASTOLIC BLOOD PRESSURE: 73 MMHG

## 2025-06-26 DIAGNOSIS — Z23 NEED FOR DIPHTHERIA-TETANUS-PERTUSSIS (TDAP) VACCINE: ICD-10-CM

## 2025-06-26 DIAGNOSIS — R30.0 DYSURIA: ICD-10-CM

## 2025-06-26 DIAGNOSIS — Z23 NEED FOR PROPHYLACTIC VACCINATION AGAINST STREPTOCOCCUS PNEUMONIAE (PNEUMOCOCCUS): ICD-10-CM

## 2025-06-26 DIAGNOSIS — I25.2 HISTORY OF ST ELEVATION MYOCARDIAL INFARCTION (STEMI): ICD-10-CM

## 2025-06-26 DIAGNOSIS — R73.03 PREDIABETES: ICD-10-CM

## 2025-06-26 DIAGNOSIS — L23.7 ALLERGIC CONTACT DERMATITIS DUE TO PLANTS, EXCEPT FOOD: ICD-10-CM

## 2025-06-26 DIAGNOSIS — E78.5 DYSLIPIDEMIA, GOAL LDL BELOW 100: ICD-10-CM

## 2025-06-26 DIAGNOSIS — R91.1 PULMONARY NODULE, RIGHT: ICD-10-CM

## 2025-06-26 DIAGNOSIS — I10 ESSENTIAL HYPERTENSION: Primary | ICD-10-CM

## 2025-06-26 DIAGNOSIS — R77.1 HYPERGLOBULINEMIA: ICD-10-CM

## 2025-06-26 PROBLEM — H25.9 AGE-RELATED CATARACT OF BOTH EYES: Status: ACTIVE | Noted: 2025-05-02

## 2025-06-26 PROBLEM — N52.9 ERECTILE DYSFUNCTION: Status: ACTIVE | Noted: 2025-03-17

## 2025-06-26 PROCEDURE — 99214 OFFICE O/P EST MOD 30 MIN: CPT | Performed by: FAMILY MEDICINE

## 2025-06-26 PROCEDURE — 3078F DIAST BP <80 MM HG: CPT | Performed by: FAMILY MEDICINE

## 2025-06-26 PROCEDURE — G0009 ADMIN PNEUMOCOCCAL VACCINE: HCPCS | Performed by: FAMILY MEDICINE

## 2025-06-26 PROCEDURE — PBSHW PNEUMOCOCCAL, PCV20, PREVNAR 20, (AGE 6W+), IM, PF: Performed by: FAMILY MEDICINE

## 2025-06-26 PROCEDURE — 90715 TDAP VACCINE 7 YRS/> IM: CPT | Performed by: FAMILY MEDICINE

## 2025-06-26 PROCEDURE — 3074F SYST BP LT 130 MM HG: CPT | Performed by: FAMILY MEDICINE

## 2025-06-26 PROCEDURE — PBSHW TDAP, BOOSTRIX, (AGE 10 YRS+), IM: Performed by: FAMILY MEDICINE

## 2025-06-26 RX ORDER — PREDNISOLONE ACETATE 10 MG/ML
SUSPENSION/ DROPS OPHTHALMIC
COMMUNITY
Start: 2025-05-23

## 2025-06-26 RX ORDER — PODOFILOX 5 MG/ML
SOLUTION TOPICAL
COMMUNITY

## 2025-06-26 ASSESSMENT — ENCOUNTER SYMPTOMS
CONSTIPATION: 0
WHEEZING: 0
NAUSEA: 0
BACK PAIN: 0
COUGH: 0
ABDOMINAL PAIN: 0
DIARRHEA: 0
SORE THROAT: 0
SHORTNESS OF BREATH: 0

## 2025-06-26 NOTE — PROGRESS NOTES
Chief Complaint   Patient presents with    Follow-up    Other     Rash on right index finger coming back.         \"Have you been to the ER, urgent care clinic since your last visit?  Hospitalized since your last visit?\"    NO    “Have you seen or consulted any other health care providers outside of [facility] since your last visit?”    No            Click Here for Release of Records Request           5/28/2025     3:35 PM   PHQ-9    Little interest or pleasure in doing things 0   Feeling down, depressed, or hopeless 0   PHQ-2 Score 0   PHQ-9 Total Score 0           Financial Resource Strain: Low Risk  (1/25/2024)    Overall Financial Resource Strain (CARDIA)     Difficulty of Paying Living Expenses: Not hard at all      Food Insecurity: No Food Insecurity (2/13/2025)    Hunger Vital Sign     Worried About Running Out of Food in the Last Year: Never true     Ran Out of Food in the Last Year: Never true          Health Maintenance Due   Topic Date Due    HIV screen  Never done    DTaP/Tdap/Td vaccine (1 - Tdap) Never done    Shingles vaccine (1 of 2) Never done    Pneumococcal 50+ years Vaccine (1 of 1 - PCV) Never done    COVID-19 Vaccine (5 - 2024-25 season) 09/01/2024       
lb 12.8 oz)   SpO2 97%   BMI 23.89 kg/m²       Physical Exam  Vitals and nursing note reviewed.   Constitutional:       Appearance: Normal appearance.   HENT:      Head: Normocephalic.      Right Ear: Tympanic membrane normal.      Left Ear: Tympanic membrane normal.      Nose: Nose normal.      Mouth/Throat:      Mouth: Mucous membranes are moist.   Eyes:      Extraocular Movements: Extraocular movements intact.      Pupils: Pupils are equal, round, and reactive to light.   Cardiovascular:      Rate and Rhythm: Normal rate and regular rhythm.      Pulses: Normal pulses.      Heart sounds: Normal heart sounds.   Pulmonary:      Effort: Pulmonary effort is normal. No respiratory distress.      Breath sounds: Normal breath sounds.   Abdominal:      General: There is no distension.      Palpations: Abdomen is soft.      Tenderness: There is no abdominal tenderness.   Musculoskeletal:         General: Normal range of motion.      Cervical back: Normal range of motion and neck supple.   Skin:     General: Skin is warm.      Capillary Refill: Capillary refill takes less than 2 seconds.      Findings: No rash.   Neurological:      General: No focal deficit present.      Mental Status: He is alert and oriented to person, place, and time. Mental status is at baseline.   Psychiatric:         Mood and Affect: Mood normal.         Behavior: Behavior normal.         Labs/Imaging/Diagnostics   Labs:  Lab Results   Component Value Date    WBC 6.8 02/14/2025    HGB 14.5 02/14/2025    HCT 44.1 02/14/2025    MCV 86.1 02/14/2025     02/14/2025     Lab Results   Component Value Date     02/14/2025    K 3.9 02/14/2025     02/14/2025    CO2 29 02/14/2025    BUN 14 02/14/2025    CREATININE 1.09 02/14/2025    GLUCOSE 102 (H) 02/14/2025    CALCIUM 9.1 02/14/2025    BILITOT 0.8 02/14/2025    ALKPHOS 94 02/14/2025    AST 28 02/14/2025    ALT 36 02/14/2025    LABGLOM 78 02/14/2025    GFRAA >60 06/28/2022    AGRATIO 1.0 (L)

## 2025-06-27 ENCOUNTER — RESULTS FOLLOW-UP (OUTPATIENT)
Age: 60
End: 2025-06-27

## 2025-06-27 LAB
ALBUMIN SERPL-MCNC: 4.2 G/DL (ref 3.5–5)
ALBUMIN/GLOB SERPL: 1.1 (ref 1.1–2.2)
ALP SERPL-CCNC: 86 U/L (ref 45–117)
ALT SERPL-CCNC: 29 U/L (ref 12–78)
ANION GAP SERPL CALC-SCNC: 7 MMOL/L (ref 2–12)
APPEARANCE UR: ABNORMAL
AST SERPL-CCNC: 18 U/L (ref 15–37)
BACTERIA URNS QL MICRO: NEGATIVE /HPF
BILIRUB SERPL-MCNC: 0.9 MG/DL (ref 0.2–1)
BILIRUB UR QL: NEGATIVE
BUN SERPL-MCNC: 17 MG/DL (ref 6–20)
BUN/CREAT SERPL: 15 (ref 12–20)
CALCIUM SERPL-MCNC: 9.1 MG/DL (ref 8.5–10.1)
CHLORIDE SERPL-SCNC: 106 MMOL/L (ref 97–108)
CO2 SERPL-SCNC: 27 MMOL/L (ref 21–32)
COLOR UR: ABNORMAL
CREAT SERPL-MCNC: 1.17 MG/DL (ref 0.7–1.3)
EPITH CASTS URNS QL MICRO: ABNORMAL /LPF
EST. AVERAGE GLUCOSE BLD GHB EST-MCNC: 120 MG/DL
GLOBULIN SER CALC-MCNC: 3.9 G/DL (ref 2–4)
GLUCOSE SERPL-MCNC: 80 MG/DL (ref 65–100)
GLUCOSE UR STRIP.AUTO-MCNC: NEGATIVE MG/DL
HBA1C MFR BLD: 5.8 % (ref 4–5.6)
HGB UR QL STRIP: NEGATIVE
HYALINE CASTS URNS QL MICRO: ABNORMAL /LPF (ref 0–5)
KETONES UR QL STRIP.AUTO: ABNORMAL MG/DL
LEUKOCYTE ESTERASE UR QL STRIP.AUTO: NEGATIVE
NITRITE UR QL STRIP.AUTO: NEGATIVE
PH UR STRIP: 5 (ref 5–8)
POTASSIUM SERPL-SCNC: 4.3 MMOL/L (ref 3.5–5.1)
PROT SERPL-MCNC: 8.1 G/DL (ref 6.4–8.2)
PROT UR STRIP-MCNC: 30 MG/DL
RBC #/AREA URNS HPF: ABNORMAL /HPF (ref 0–5)
SODIUM SERPL-SCNC: 140 MMOL/L (ref 136–145)
SP GR UR REFRACTOMETRY: 1.02 (ref 1–1.03)
URINE CULTURE IF INDICATED: ABNORMAL
UROBILINOGEN UR QL STRIP.AUTO: 0.2 EU/DL (ref 0.2–1)
WBC URNS QL MICRO: ABNORMAL /HPF (ref 0–4)

## 2025-07-23 ENCOUNTER — HOSPITAL ENCOUNTER (OUTPATIENT)
Age: 60
Discharge: HOME | End: 2025-07-26
Payer: COMMERCIAL

## 2025-07-23 DIAGNOSIS — R91.1 PULMONARY NODULE, RIGHT: ICD-10-CM

## 2025-07-23 PROCEDURE — 71250 CT THORAX DX C-: CPT

## 2025-07-26 DIAGNOSIS — I25.10 CORONARY ARTERIOSCLEROSIS: ICD-10-CM

## 2025-07-26 DIAGNOSIS — I10 ESSENTIAL HYPERTENSION: ICD-10-CM

## 2025-07-28 RX ORDER — VALSARTAN 40 MG/1
20 TABLET ORAL DAILY
Qty: 45 TABLET | Refills: 1 | Status: SHIPPED | OUTPATIENT
Start: 2025-07-28

## 2025-07-28 NOTE — TELEPHONE ENCOUNTER
PCP: Elisha Marie MD    Last appt: 6/26/2025   Future Appointments   Date Time Provider Department Center   8/13/2025  9:20 AM Elisha Marie MD [facility] DEP       Requested Prescriptions     Pending Prescriptions Disp Refills    valsartan (DIOVAN) 40 MG tablet [Pharmacy Med Name: VALSARTAN 40 MG TABLET] 45 tablet 1     Sig: TAKE 1/2 TABLET BY MOUTH DAILY         Prior labs and Blood pressures:  BP Readings from Last 3 Encounters:   06/26/25 113/73   05/28/25 120/76   02/13/25 116/73     Lab Results   Component Value Date/Time     06/26/2025 03:40 PM    K 4.3 06/26/2025 03:40 PM     06/26/2025 03:40 PM    CO2 27 06/26/2025 03:40 PM    BUN 17 06/26/2025 03:40 PM    GFRAA >60 06/28/2022 02:31 PM     No results found for: \"HBA1C\", \"[id number]\"  Lab Results   Component Value Date/Time    CHOL 99 02/14/2025 08:14 AM    HDL 32 02/14/2025 08:14 AM    LDL 50 02/14/2025 08:14 AM    LDL 42.4 01/25/2024 11:23 AM    VLDL 17 02/14/2025 08:14 AM    VLDL 27 09/24/2020 03:28 PM     No results found for: \"VITD3\"    Lab Results   Component Value Date/Time    TSH 2.09 09/12/2023 03:28 PM

## 2025-08-14 ENCOUNTER — OFFICE VISIT (OUTPATIENT)
Age: 60
End: 2025-08-14
Payer: COMMERCIAL

## 2025-08-14 VITALS
WEIGHT: 159.4 LBS | HEART RATE: 66 BPM | DIASTOLIC BLOOD PRESSURE: 70 MMHG | SYSTOLIC BLOOD PRESSURE: 120 MMHG | RESPIRATION RATE: 16 BRPM | TEMPERATURE: 97.1 F | BODY MASS INDEX: 23.61 KG/M2 | HEIGHT: 69 IN | OXYGEN SATURATION: 99 %

## 2025-08-14 DIAGNOSIS — H25.013 CORTICAL AGE-RELATED CATARACT OF BOTH EYES: ICD-10-CM

## 2025-08-14 DIAGNOSIS — E78.5 DYSLIPIDEMIA, GOAL LDL BELOW 100: ICD-10-CM

## 2025-08-14 DIAGNOSIS — I25.10 CORONARY ARTERIOSCLEROSIS: ICD-10-CM

## 2025-08-14 DIAGNOSIS — L23.7 ALLERGIC CONTACT DERMATITIS DUE TO PLANTS, EXCEPT FOOD: ICD-10-CM

## 2025-08-14 DIAGNOSIS — I25.2 HISTORY OF ST ELEVATION MYOCARDIAL INFARCTION (STEMI): ICD-10-CM

## 2025-08-14 DIAGNOSIS — Z01.818 PREOP GENERAL PHYSICAL EXAM: Primary | ICD-10-CM

## 2025-08-14 DIAGNOSIS — R73.03 PREDIABETES: ICD-10-CM

## 2025-08-14 DIAGNOSIS — Z23 NEED FOR PROPHYLACTIC VACCINATION AND INOCULATION AGAINST VARICELLA: ICD-10-CM

## 2025-08-14 DIAGNOSIS — I10 ESSENTIAL HYPERTENSION: ICD-10-CM

## 2025-08-14 PROCEDURE — 99214 OFFICE O/P EST MOD 30 MIN: CPT | Performed by: FAMILY MEDICINE

## 2025-08-14 PROCEDURE — 90471 IMMUNIZATION ADMIN: CPT | Performed by: FAMILY MEDICINE

## 2025-08-14 PROCEDURE — PBSHW ZOSTER, SHINGRIX, (18 YRS +), IM: Performed by: FAMILY MEDICINE

## 2025-08-14 PROCEDURE — 3074F SYST BP LT 130 MM HG: CPT | Performed by: FAMILY MEDICINE

## 2025-08-14 PROCEDURE — 3078F DIAST BP <80 MM HG: CPT | Performed by: FAMILY MEDICINE

## 2025-08-14 RX ORDER — CLOTRIMAZOLE AND BETAMETHASONE DIPROPIONATE 10; .64 MG/G; MG/G
CREAM TOPICAL
Qty: 15 G | Refills: 0 | Status: SHIPPED | OUTPATIENT
Start: 2025-08-14

## 2025-08-14 ASSESSMENT — ENCOUNTER SYMPTOMS
COUGH: 0
NAUSEA: 0
CONSTIPATION: 0
WHEEZING: 0
SORE THROAT: 0
SHORTNESS OF BREATH: 0
DIARRHEA: 0
BACK PAIN: 0
ABDOMINAL PAIN: 0

## 2025-08-14 ASSESSMENT — LIFESTYLE VARIABLES
HOW OFTEN DO YOU HAVE A DRINK CONTAINING ALCOHOL: NEVER
HOW MANY STANDARD DRINKS CONTAINING ALCOHOL DO YOU HAVE ON A TYPICAL DAY: PATIENT DOES NOT DRINK